# Patient Record
Sex: FEMALE | Race: WHITE | NOT HISPANIC OR LATINO | Employment: FULL TIME
[De-identification: names, ages, dates, MRNs, and addresses within clinical notes are randomized per-mention and may not be internally consistent; named-entity substitution may affect disease eponyms.]

---

## 2018-03-03 ENCOUNTER — HEALTH MAINTENANCE LETTER (OUTPATIENT)
Age: 43
End: 2018-03-03

## 2019-01-15 ENCOUNTER — TRANSFERRED RECORDS (OUTPATIENT)
Dept: HEALTH INFORMATION MANAGEMENT | Facility: CLINIC | Age: 44
End: 2019-01-15

## 2019-01-15 LAB
C TRACH DNA SPEC QL PROBE+SIG AMP: NEGATIVE
HPV ABSTRACT: NORMAL
N GONORRHOEA DNA SPEC QL PROBE+SIG AMP: NEGATIVE
PAP-ABSTRACT: NORMAL
SPECIMEN DESCRIP: NORMAL
SPECIMEN DESCRIPTION: NORMAL

## 2019-03-18 ENCOUNTER — OFFICE VISIT (OUTPATIENT)
Dept: FAMILY MEDICINE | Facility: CLINIC | Age: 44
End: 2019-03-18
Payer: COMMERCIAL

## 2019-03-18 VITALS
BODY MASS INDEX: 32.77 KG/M2 | OXYGEN SATURATION: 100 % | HEIGHT: 66 IN | SYSTOLIC BLOOD PRESSURE: 123 MMHG | HEART RATE: 99 BPM | RESPIRATION RATE: 18 BRPM | DIASTOLIC BLOOD PRESSURE: 77 MMHG | TEMPERATURE: 99.1 F

## 2019-03-18 DIAGNOSIS — Z11.4 SCREENING FOR HIV (HUMAN IMMUNODEFICIENCY VIRUS): ICD-10-CM

## 2019-03-18 DIAGNOSIS — J20.9 ACUTE BRONCHITIS WITH SYMPTOMS > 10 DAYS: Primary | ICD-10-CM

## 2019-03-18 PROCEDURE — 99214 OFFICE O/P EST MOD 30 MIN: CPT | Performed by: PHYSICIAN ASSISTANT

## 2019-03-18 RX ORDER — AZITHROMYCIN 250 MG/1
TABLET, FILM COATED ORAL
Qty: 6 TABLET | Refills: 0 | Status: SHIPPED | OUTPATIENT
Start: 2019-03-18 | End: 2019-03-23

## 2019-03-18 RX ORDER — ALBUTEROL SULFATE 90 UG/1
2 AEROSOL, METERED RESPIRATORY (INHALATION) EVERY 6 HOURS
Qty: 8.5 G | Refills: 0 | Status: SHIPPED | OUTPATIENT
Start: 2019-03-18 | End: 2022-11-22

## 2019-03-18 ASSESSMENT — PAIN SCALES - GENERAL: PAINLEVEL: NO PAIN (0)

## 2019-03-18 NOTE — PROGRESS NOTES
SUBJECTIVE:   Radha Alvarez is a 43 year old female who presents to clinic today for the following health issues:    Acute Illness   Acute illness concerns: cough  Onset: february 14th 2019    Fever: no    Chills/Sweats: no    Headache (location?): no    Sinus Pressure:No- it started off with it and it been gone for 2 weeks now     Conjunctivitis:  no    Ear Pain: no    Rhinorrhea: YES- a little    Congestion: YES    Sore Throat: no- started off with it as well and has been gone for 2 weeks as well     Cough: YES-non-productive but when coughing really hard it becomes productive (4 times)     Wheeze: YES    Shortness of breath: YES     Decreased Appetite: no    Nausea: no    Vomiting: no    Diarrhea:  no    Dysuria/Freq.: no    Fatigue/Achiness: YES- Fatigue    Sick/Strep Exposure: YES- Possibly at a meeting in [place]      Therapies Tried and outcome: DM, sudafed, Theraflu, airborne, afrin, and cough drops            Problem list and histories reviewed & adjusted, as indicated.  Additional history: as documented    Patient Active Problem List   Diagnosis     MS (multiple sclerosis) (H)     Obesity, Class I, BMI 30-34.9     Past Surgical History:   Procedure Laterality Date     TUBAL LIGATION  4/2006       Social History     Tobacco Use     Smoking status: Never Smoker     Smokeless tobacco: Never Used   Substance Use Topics     Alcohol use: Yes     Family History   Family history unknown: Yes         Current Outpatient Medications   Medication Sig Dispense Refill     albuterol (PROAIR HFA/PROVENTIL HFA/VENTOLIN HFA) 108 (90 Base) MCG/ACT inhaler Inhale 2 puffs into the lungs every 6 hours 8.5 g 0     levonorgestrel (MIRENA, 52 MG,) 20 MCG/24HR IUD 1 Device by Intrauterine route       oxymetazoline (AFRIN NASAL SPRAY) 0.05 % nasal spray Spray 2-3 sprays into both nostrils 2 times daily as needed for congestion 1 Bottle 0     fluticasone (FLONASE) 50 MCG/ACT nasal spray Spray 1-2 sprays into both nostrils daily  "(Patient not taking: Reported on 3/18/2019) 1 Bottle 3     temazepam (RESTORIL) 15 MG capsule Take by mouth nightly as needed       No Known Allergies    Reviewed and updated as needed this visit by clinical staff       Reviewed and updated as needed this visit by Provider         ROS:  Constitutional, HEENT, cardiovascular, pulmonary, gi and gu systems are negative, except as otherwise noted.    OBJECTIVE:     /77   Pulse 99   Temp 99.1  F (37.3  C) (Oral)   Resp 18   Ht 1.676 m (5' 6\")   LMP  (LMP Unknown)   SpO2 100%   Breastfeeding? No   BMI 32.77 kg/m    Body mass index is 32.77 kg/m .  GENERAL: healthy, alert and no distress  EYES: Eyes grossly normal to inspection, PERRL and conjunctivae and sclerae normal  HENT: normal cephalic/atraumatic, ear canals and TM's normal, nose and mouth without ulcers or lesions, rhinorrhea yellow, oropharynx clear and oral mucous membranes moist  NECK: no adenopathy, no asymmetry, masses, or scars and thyroid normal to palpation  RESP: no rales , no rhonchi and inspiratory wheezes bilateral  CV: regular rate and rhythm, normal S1 S2, no S3 or S4, no murmur, click or rub, no peripheral edema and peripheral pulses strong  ABDOMEN: soft, nontender, no hepatosplenomegaly, no masses and bowel sounds normal  MS: no gross musculoskeletal defects noted, no edema    Diagnostic Test Results:  none     ASSESSMENT/PLAN:       ICD-10-CM    1. Acute bronchitis with symptoms > 10 days J20.9 azithromycin (ZITHROMAX) 250 MG tablet     albuterol (PROAIR HFA/PROVENTIL HFA/VENTOLIN HFA) 108 (90 Base) MCG/ACT inhaler   2. Screening for HIV (human immunodeficiency virus) Z11.4      1.Azithromycin 2 tablet today then 1 tablet daily for 4 more days  Albuterol 2 puffs every 4-6 hours as needed  Mucinex DM 1 tablet twice a day for 10 days   Increase water   Follow up in 5 days or as needed   2. Declined       Marcella Hernandez PA-C  [facility]  "

## 2019-03-18 NOTE — PATIENT INSTRUCTIONS
Azithromycin 2 tablet today then 1 tablet daily for 4 more days  Albuterol 2 puffs every 4-6 hours as needed  Mucinex DM 1 tablet twice a day for 10 days   Increase water

## 2019-03-18 NOTE — Clinical Note
Please abstract the following data from this visit with this patient into the appropriate field in Epic:Pap smear done on this date: 2/2019 (approximately), by this group: [facility], results were NIL, negative HPV.

## 2019-04-09 DIAGNOSIS — J20.9 ACUTE BRONCHITIS WITH SYMPTOMS > 10 DAYS: ICD-10-CM

## 2019-04-09 NOTE — TELEPHONE ENCOUNTER
"Requested Prescriptions   Pending Prescriptions Disp Refills     VENTOLIN  (90 Base) MCG/ACT inhaler [Pharmacy Med Name: VENTOLIN HFA INH W/DOS CTR [id number]]  Last Written Prescription Date:  03/18/19  Last Fill Quantity: 8.5g,  # refills: 0   Last Office Visit with [facility] or [facility] prescribing provider:  03/18/19-Mary   Future Office Visit:    18 g 0     Sig: INHALE 2 PUFFS INTO THE LUNGS EVERY 6 HOURS       Asthma Maintenance Inhalers - Anticholinergics Passed - 4/9/2019  4:03 AM        Passed - Patient is age 12 years or older        Passed - Recent (12 mo) or future (30 days) visit within the authorizing provider's specialty     Patient had office visit in the last 12 months or has a visit in the next 30 days with authorizing provider or within the authorizing provider's specialty.  See \"Patient Info\" tab in inbasket, or \"Choose Columns\" in Meds & Orders section of the refill encounter.              Passed - Medication is active on med list          "

## 2019-04-10 RX ORDER — ALBUTEROL SULFATE 90 UG/1
AEROSOL, METERED RESPIRATORY (INHALATION)
Qty: 18 G | Refills: 0 | OUTPATIENT
Start: 2019-04-10

## 2019-04-10 NOTE — TELEPHONE ENCOUNTER
Patient needs apt or additional assesment if needs further refills. Inhaler was given for acute bronchitis. No history of asthma or COPD noted in problem list.  Per provider documentation from visit on 3/18/19, patient to RTC if continued symptoms.  Request denied to pharmacy for this reason.    Luli Longoria RN

## 2019-10-03 ENCOUNTER — HEALTH MAINTENANCE LETTER (OUTPATIENT)
Age: 44
End: 2019-10-03

## 2019-12-17 ENCOUNTER — OFFICE VISIT (OUTPATIENT)
Dept: FAMILY MEDICINE | Facility: CLINIC | Age: 44
End: 2019-12-17
Payer: COMMERCIAL

## 2019-12-17 VITALS
TEMPERATURE: 98.6 F | WEIGHT: 217 LBS | RESPIRATION RATE: 16 BRPM | SYSTOLIC BLOOD PRESSURE: 142 MMHG | OXYGEN SATURATION: 98 % | HEART RATE: 92 BPM | DIASTOLIC BLOOD PRESSURE: 84 MMHG | BODY MASS INDEX: 34.87 KG/M2 | HEIGHT: 66 IN

## 2019-12-17 DIAGNOSIS — F41.9 ANXIETY: Primary | ICD-10-CM

## 2019-12-17 DIAGNOSIS — R73.09 ELEVATED GLUCOSE: ICD-10-CM

## 2019-12-17 DIAGNOSIS — R00.2 PALPITATIONS: ICD-10-CM

## 2019-12-17 LAB
ANION GAP SERPL CALCULATED.3IONS-SCNC: 9 MMOL/L (ref 3–14)
BASOPHILS # BLD AUTO: 0 10E9/L (ref 0–0.2)
BASOPHILS NFR BLD AUTO: 0.3 %
BUN SERPL-MCNC: 10 MG/DL (ref 7–30)
CALCIUM SERPL-MCNC: 9.2 MG/DL (ref 8.5–10.1)
CHLORIDE SERPL-SCNC: 106 MMOL/L (ref 94–109)
CO2 SERPL-SCNC: 24 MMOL/L (ref 20–32)
CREAT SERPL-MCNC: 0.71 MG/DL (ref 0.52–1.04)
DIFFERENTIAL METHOD BLD: NORMAL
EOSINOPHIL # BLD AUTO: 0.1 10E9/L (ref 0–0.7)
EOSINOPHIL NFR BLD AUTO: 1 %
ERYTHROCYTE [DISTWIDTH] IN BLOOD BY AUTOMATED COUNT: 12.3 % (ref 10–15)
GFR SERPL CREATININE-BSD FRML MDRD: >90 ML/MIN/{1.73_M2}
GLUCOSE SERPL-MCNC: 135 MG/DL (ref 70–99)
HBA1C MFR BLD: 7.4 % (ref 0–5.6)
HCT VFR BLD AUTO: 42.7 % (ref 35–47)
HGB BLD-MCNC: 15.1 G/DL (ref 11.7–15.7)
LYMPHOCYTES # BLD AUTO: 2.3 10E9/L (ref 0.8–5.3)
LYMPHOCYTES NFR BLD AUTO: 34.8 %
MCH RBC QN AUTO: 30.2 PG (ref 26.5–33)
MCHC RBC AUTO-ENTMCNC: 35.4 G/DL (ref 31.5–36.5)
MCV RBC AUTO: 85 FL (ref 78–100)
MONOCYTES # BLD AUTO: 0.5 10E9/L (ref 0–1.3)
MONOCYTES NFR BLD AUTO: 7.9 %
NEUTROPHILS # BLD AUTO: 3.8 10E9/L (ref 1.6–8.3)
NEUTROPHILS NFR BLD AUTO: 56 %
PLATELET # BLD AUTO: 327 10E9/L (ref 150–450)
POTASSIUM SERPL-SCNC: 3.6 MMOL/L (ref 3.4–5.3)
RBC # BLD AUTO: 5 10E12/L (ref 3.8–5.2)
SODIUM SERPL-SCNC: 139 MMOL/L (ref 133–144)
TSH SERPL DL<=0.005 MIU/L-ACNC: 0.57 MU/L (ref 0.4–4)
WBC # BLD AUTO: 6.7 10E9/L (ref 4–11)

## 2019-12-17 PROCEDURE — 36415 COLL VENOUS BLD VENIPUNCTURE: CPT | Performed by: NURSE PRACTITIONER

## 2019-12-17 PROCEDURE — 83036 HEMOGLOBIN GLYCOSYLATED A1C: CPT | Performed by: NURSE PRACTITIONER

## 2019-12-17 PROCEDURE — 93000 ELECTROCARDIOGRAM COMPLETE: CPT | Performed by: NURSE PRACTITIONER

## 2019-12-17 PROCEDURE — 80048 BASIC METABOLIC PNL TOTAL CA: CPT | Performed by: NURSE PRACTITIONER

## 2019-12-17 PROCEDURE — 85025 COMPLETE CBC W/AUTO DIFF WBC: CPT | Performed by: NURSE PRACTITIONER

## 2019-12-17 PROCEDURE — 84443 ASSAY THYROID STIM HORMONE: CPT | Performed by: NURSE PRACTITIONER

## 2019-12-17 PROCEDURE — 99214 OFFICE O/P EST MOD 30 MIN: CPT | Performed by: NURSE PRACTITIONER

## 2019-12-17 RX ORDER — HYDROXYZINE HYDROCHLORIDE 10 MG/1
10-50 TABLET, FILM COATED ORAL EVERY 6 HOURS PRN
Qty: 60 TABLET | Refills: 2 | Status: SHIPPED | OUTPATIENT
Start: 2019-12-17 | End: 2022-10-26

## 2019-12-17 ASSESSMENT — ANXIETY QUESTIONNAIRES
5. BEING SO RESTLESS THAT IT IS HARD TO SIT STILL: NEARLY EVERY DAY
6. BECOMING EASILY ANNOYED OR IRRITABLE: MORE THAN HALF THE DAYS
GAD7 TOTAL SCORE: 19
IF YOU CHECKED OFF ANY PROBLEMS ON THIS QUESTIONNAIRE, HOW DIFFICULT HAVE THESE PROBLEMS MADE IT FOR YOU TO DO YOUR WORK, TAKE CARE OF THINGS AT HOME, OR GET ALONG WITH OTHER PEOPLE: VERY DIFFICULT
1. FEELING NERVOUS, ANXIOUS, OR ON EDGE: NEARLY EVERY DAY
7. FEELING AFRAID AS IF SOMETHING AWFUL MIGHT HAPPEN: MORE THAN HALF THE DAYS
2. NOT BEING ABLE TO STOP OR CONTROL WORRYING: NEARLY EVERY DAY
3. WORRYING TOO MUCH ABOUT DIFFERENT THINGS: NEARLY EVERY DAY

## 2019-12-17 ASSESSMENT — MIFFLIN-ST. JEOR: SCORE: 1656.06

## 2019-12-17 ASSESSMENT — PATIENT HEALTH QUESTIONNAIRE - PHQ9
5. POOR APPETITE OR OVEREATING: NEARLY EVERY DAY
SUM OF ALL RESPONSES TO PHQ QUESTIONS 1-9: 0

## 2019-12-17 ASSESSMENT — PAIN SCALES - GENERAL: PAINLEVEL: NO PAIN (0)

## 2019-12-17 NOTE — PROGRESS NOTES
Ariadne Alvarez is a 44 year old female who presents to clinic today for the following health issues:    HPI   Anxiety     How are you doing with your anxiety since your last visit? Worsened     Are you having other symptoms that might be associated with anxiety? Yes:  chest tightness, rapid heart rate, shakiness     Have you had a significant life event? Relationship Concerns, Job Concerns and OTHER: holiday      Are you feeling depressed? No    Do you have any concerns with your use of alcohol or other drugs? No  Patient has been under a lot of stress recently with work (she is a  in healthcare and preparing for the holidays).  She also states a 10 year relationship just ended on 12/8.  Since then she has noted panic attacks as described above.  Feels like her normal coping skills are not working to deal with stressors.  Has never been treated for anxiety or depression previously.  She did go through therapy when going through a divorce 10 years ago but has not done therapy since then.      Social History     Tobacco Use     Smoking status: Never Smoker     Smokeless tobacco: Never Used   Substance Use Topics     Alcohol use: Yes     Drug use: No     RANJANA-7 SCORE 12/17/2019   Total Score 19     PHQ 12/17/2019   PHQ-9 Total Score 0   Q9: Thoughts of better off dead/self-harm past 2 weeks Not at all     Last PHQ-9 12/17/2019   1.  Little interest or pleasure in doing things 0   2.  Feeling down, depressed, or hopeless 0   3.  Trouble falling or staying asleep, or sleeping too much 0   4.  Feeling tired or having little energy 0   5.  Poor appetite or overeating 0   6.  Feeling bad about yourself 0   7.  Trouble concentrating 0   8.  Moving slowly or restless 0   Q9: Thoughts of better off dead/self-harm past 2 weeks 0   PHQ-9 Total Score 0   Difficulty at work, home, or with people Not difficult at all     RANJANA-7  12/17/2019   1. Feeling nervous, anxious, or on edge 3   2. Not being able to stop or  control worrying 3   3. Worrying too much about different things 3   4. Trouble relaxing 3   5. Being so restless that it is hard to sit still 3   6. Becoming easily annoyed or irritable 2   7. Feeling afraid, as if something awful might happen 2   RANJANA-7 Total Score 19   If you checked any problems, how difficult have they made it for you to do your work, take care of things at home, or get along with other people? Very difficult         How many days per week do you miss taking your medication? 0      Patient Active Problem List   Diagnosis     MS (multiple sclerosis) (H)     Obesity, Class I, BMI 30-34.9     Past Surgical History:   Procedure Laterality Date     TUBAL LIGATION  4/2006       Social History     Tobacco Use     Smoking status: Never Smoker     Smokeless tobacco: Never Used   Substance Use Topics     Alcohol use: Yes     Family History   Family history unknown: Yes         Current Outpatient Medications   Medication Sig Dispense Refill     hydrOXYzine (ATARAX) 10 MG tablet Take 1-5 tablets (10-50 mg) by mouth every 6 hours as needed for anxiety 60 tablet 2     levonorgestrel (MIRENA, 52 MG,) 20 MCG/24HR IUD 1 Device by Intrauterine route       albuterol (PROAIR HFA/PROVENTIL HFA/VENTOLIN HFA) 108 (90 Base) MCG/ACT inhaler Inhale 2 puffs into the lungs every 6 hours (Patient not taking: Reported on 12/17/2019) 8.5 g 0     fluticasone (FLONASE) 50 MCG/ACT nasal spray Spray 1-2 sprays into both nostrils daily (Patient not taking: Reported on 3/18/2019) 1 Bottle 3     oxymetazoline (AFRIN NASAL SPRAY) 0.05 % nasal spray Spray 2-3 sprays into both nostrils 2 times daily as needed for congestion (Patient not taking: Reported on 12/17/2019) 1 Bottle 0     temazepam (RESTORIL) 15 MG capsule Take by mouth nightly as needed       Allergies   Allergen Reactions     Adhesive Tape      Shellfish Allergy Hives and Other (See Comments)     Body swelled      BP Readings from Last 3 Encounters:   12/17/19 (!) 142/84  "  03/18/19 123/77   11/22/16 112/81    Wt Readings from Last 3 Encounters:   12/17/19 98.4 kg (217 lb)   11/22/16 92.1 kg (203 lb)   06/08/16 91.2 kg (201 lb)                      Reviewed and updated as needed this visit by Provider         Review of Systems   ROS COMP: Constitutional, HEENT, cardiovascular, pulmonary, GI, , musculoskeletal, neuro, skin, endocrine and psych systems are negative, except as otherwise noted.      Objective    BP (!) 142/84 (BP Location: Right arm, Patient Position: Chair, Cuff Size: Adult Large)   Pulse 92   Temp 98.6  F (37  C) (Oral)   Resp 16   Ht 1.676 m (5' 6\")   Wt 98.4 kg (217 lb)   SpO2 98%   BMI 35.02 kg/m    Body mass index is 35.02 kg/m .  Physical Exam   GENERAL: healthy, alert and no distress  NECK: no adenopathy, no asymmetry, masses, or scars and thyroid normal to palpation  RESP: lungs clear to auscultation - no rales, rhonchi or wheezes  CV: regular rate and rhythm, normal S1 S2, no S3 or S4, no murmur, click or rub, no peripheral edema and peripheral pulses strong  ABDOMEN: soft, nontender, no hepatosplenomegaly, no masses and bowel sounds normal  MS: no gross musculoskeletal defects noted, no edema  PSYCH: mentation appears normal, tearful and anxious    Diagnostic Test Results:  No results found for this or any previous visit (from the past 24 hour(s)).  EKG - appears normal, NSR, normal axis, normal intervals, no acute ST/T changes c/w ischemia, no LVH by voltage criteria, there are no prior tracings available        Assessment & Plan     1. Anxiety  Patient does not want long term medication at this time though we discussed serotonin specific reuptake inhibitors as an option.  Will start with hydroxyzine.  Can call in 1-2 days if not working.  Then would try Buspar.  Discussed mindfulness strategies, exercise, other coping mechanisms.  She will schedule with her previous therapist.  If unable, referral entered for our clinic.   - hydrOXYzine (ATARAX) 10 " "MG tablet; Take 1-5 tablets (10-50 mg) by mouth every 6 hours as needed for anxiety  Dispense: 60 tablet; Refill: 2  - MENTAL HEALTH REFERRAL  - Adult; Outpatient Treatment; Individual/Couples/Family/Group Therapy/Health Psychology; G: [facility] [phone]; We will contact you to schedule the appointment or please call with any questions    2. Palpitations  Normal EKG.  - EKG 12-lead complete w/read - Clinics  - Basic metabolic panel  (Ca, Cl, CO2, Creat, Gluc, K, Na, BUN)  - CBC with platelets and differential  - TSH with free T4 reflex    3. Elevated glucose  As seen on previous labs.    - Hemoglobin A1c     BMI:   Estimated body mass index is 35.02 kg/m  as calculated from the following:    Height as of this encounter: 1.676 m (5' 6\").    Weight as of this encounter: 98.4 kg (217 lb).   Weight management plan: Discussed healthy diet and exercise guidelines        Patient Instructions   EKG is normal.      Take hydroxyzine every 6 hours 1-5 tablets as needed for anxiety.    Start therapy.  Follow up in 3 weeks or sooner if needed.          Return in about 3 weeks (around 1/7/2020) for Anxiety Follow up.    [facility]        "

## 2019-12-17 NOTE — LETTER
My Depression Action Plan  Name: Radha Alvarez   Date of Birth 1975  Date: 12/17/2019    My doctor: Katerin Jiang   My clinic: [address] [id number] [phone]          GREEN    ZONE   Good Control    What it looks like:     Things are going generally well. You have normal up s and down s. You may even feel depressed from time to time, but bad moods usually last less than a day.   What you need to do:  1. Continue to care for yourself (see self care plan)  2. Check your depression survival kit and update it as needed  3. Follow your physician s recommendations including any medication.  4. Do not stop taking medication unless you consult with your physician first.           YELLOW         ZONE Getting Worse    What it looks like:     Depression is starting to interfere with your life.     It may be hard to get out of bed; you may be starting to isolate yourself from others.    Symptoms of depression are starting to last most all day and this has happened for several days.     You may have suicidal thoughts but they are not constant.   What you need to do:     1. Call your care team, your response to treatment will improve if you keep your care team informed of your progress. Yellow periods are signs an adjustment may need to be made.     2. Continue your self-care, even if you have to fake it!    3. Talk to someone in your support network    4. Open up your depression survival kit           RED    ZONE Medical Alert - Get Help    What it looks like:     Depression is seriously interfering with your life.     You may experience these or other symptoms: You can t get out of bed most days, can t work or engage in other necessary activities, you have trouble taking care of basic hygiene, or basic responsibilities, thoughts of suicide or death that will not go away, self-injurious behavior.     What you need to do:  1. Call your care  team and request a same-day appointment. If they are not available (weekends or after hours) call your local crisis line, emergency room or 911.            Depression Self Care Plan / Survival Kit    Self-Care for Depression  Here s the deal. Your body and mind are really not as separate as most people think.  What you do and think affects how you feel and how you feel influences what you do and think. This means if you do things that people who feel good do, it will help you feel better.  Sometimes this is all it takes.  There is also a place for medication and therapy depending on how severe your depression is, so be sure to consult with your medical provider and/ or Behavioral Health Consultant if your symptoms are worsening or not improving.     In order to better manage my stress, I will:    Exercise  Get some form of exercise, every day. This will help reduce pain and release endorphins, the  feel good  chemicals in your brain. This is almost as good as taking antidepressants!  This is not the same as joining a gym and then never going! (they count on that by the way ) It can be as simple as just going for a walk or doing some gardening, anything that will get you moving.      Hygiene   Maintain good hygiene (Get out of bed in the morning, Make your bed, Brush your teeth, Take a shower, and Get dressed like you were going to work, even if you are unemployed).  If your clothes don't fit try to get ones that do.    Diet  I will strive to eat foods that are good for me, drink plenty of water, and avoid excessive sugar, caffeine, alcohol, and other mood-altering substances.  Some foods that are helpful in depression are: complex carbohydrates, B vitamins, flaxseed, fish or fish oil, fresh fruits and vegetables.    Psychotherapy  I agree to participate in Individual Therapy (if recommended).    Medication  If prescribed medications, I agree to take them.  Missing doses can result in serious side effects.  I  understand that drinking alcohol, or other illicit drug use, may cause potential side effects.  I will not stop my medication abruptly without first discussing it with my provider.    Staying Connected With Others  I will stay in touch with my friends, family members, and my primary care provider/team.    Use your imagination  Be creative.  We all have a creative side; it doesn t matter if it s oil painting, sand castles, or mud pies! This will also kick up the endorphins.    Witness Beauty  (AKA stop and smell the roses) Take a look outside, even in mid-winter. Notice colors, textures. Watch the squirrels and birds.     Service to others  Be of service to others.  There is always someone else in need.  By helping others we can  get out of ourselves  and remember the really important things.  This also provides opportunities for practicing all the other parts of the program.    Humor  Laugh and be silly!  Adjust your TV habits for less news and crime-drama and more comedy.    Control your stress  Try breathing deep, massage therapy, biofeedback, and meditation. Find time to relax each day.     My support system    Clinic Contact:  Phone number:    Contact 1:  Phone number:    Contact 2:  Phone number:    Oriental orthodox/:  Phone number:    Therapist:  Phone number:    Local crisis center:    Phone number:    Other community support:  Phone number:

## 2019-12-17 NOTE — PATIENT INSTRUCTIONS
EKG is normal.      Take hydroxyzine every 6 hours 1-5 tablets as needed for anxiety.    Start therapy.  Follow up in 3 weeks or sooner if needed.

## 2019-12-18 ASSESSMENT — ANXIETY QUESTIONNAIRES: GAD7 TOTAL SCORE: 19

## 2019-12-19 ENCOUNTER — TELEPHONE (OUTPATIENT)
Dept: FAMILY MEDICINE | Facility: CLINIC | Age: 44
End: 2019-12-19

## 2019-12-19 DIAGNOSIS — E11.9 TYPE 2 DIABETES MELLITUS WITHOUT COMPLICATION, WITHOUT LONG-TERM CURRENT USE OF INSULIN (H): Primary | ICD-10-CM

## 2019-12-19 NOTE — TELEPHONE ENCOUNTER
We did not reach Radha Alvarez, we left a message with our contact number [phone].  If we do not hear from them within the next 3 business days we will mail a letter offering our scheduling services.    If they do call to schedule, in the future, we will inform you of the outcome.    Thank you for your referral,  [facility]

## 2019-12-19 NOTE — TELEPHONE ENCOUNTER
Please call patient as she does not check her mychart messages.  Her labs came back positive for diabetes.  Needs to make an appointment to discuss treatment. Thanks!  [place]

## 2019-12-19 NOTE — TELEPHONE ENCOUNTER
Patient contacted and informed of the below per provider documentation. Patient verbalizes understanding. She declined scheduling an appointment and requests to call back to schedule as she is in the middle of finding a PCP.     Janelle Byers RN

## 2020-01-03 ENCOUNTER — MYC MEDICAL ADVICE (OUTPATIENT)
Dept: FAMILY MEDICINE | Facility: CLINIC | Age: 45
End: 2020-01-03

## 2020-11-07 ENCOUNTER — HEALTH MAINTENANCE LETTER (OUTPATIENT)
Age: 45
End: 2020-11-07

## 2021-03-21 ENCOUNTER — HEALTH MAINTENANCE LETTER (OUTPATIENT)
Age: 46
End: 2021-03-21

## 2021-07-11 ENCOUNTER — HEALTH MAINTENANCE LETTER (OUTPATIENT)
Age: 46
End: 2021-07-11

## 2021-09-05 ENCOUNTER — HEALTH MAINTENANCE LETTER (OUTPATIENT)
Age: 46
End: 2021-09-05

## 2021-10-07 ENCOUNTER — IMMUNIZATION (OUTPATIENT)
Dept: NURSING | Facility: CLINIC | Age: 46
End: 2021-10-07
Payer: COMMERCIAL

## 2021-10-07 PROCEDURE — 91301 PR COVID VAC MODERNA 100 MCG/0.5 ML IM: CPT

## 2021-10-07 PROCEDURE — 0011A PR COVID VAC MODERNA 100 MCG/0.5 ML IM: CPT

## 2021-10-31 ENCOUNTER — HEALTH MAINTENANCE LETTER (OUTPATIENT)
Age: 46
End: 2021-10-31

## 2021-11-04 ENCOUNTER — IMMUNIZATION (OUTPATIENT)
Dept: NURSING | Facility: CLINIC | Age: 46
End: 2021-11-04
Attending: FAMILY MEDICINE
Payer: COMMERCIAL

## 2021-11-04 PROCEDURE — 0012A PR COVID VAC MODERNA 100 MCG/0.5 ML IM: CPT

## 2021-11-04 PROCEDURE — 91301 PR COVID VAC MODERNA 100 MCG/0.5 ML IM: CPT

## 2021-12-26 ENCOUNTER — HEALTH MAINTENANCE LETTER (OUTPATIENT)
Age: 46
End: 2021-12-26

## 2022-02-04 ENCOUNTER — OFFICE VISIT (OUTPATIENT)
Dept: URGENT CARE | Facility: URGENT CARE | Age: 47
End: 2022-02-04
Payer: COMMERCIAL

## 2022-02-04 VITALS
SYSTOLIC BLOOD PRESSURE: 138 MMHG | TEMPERATURE: 98.1 F | BODY MASS INDEX: 34.81 KG/M2 | HEART RATE: 89 BPM | WEIGHT: 215.7 LBS | DIASTOLIC BLOOD PRESSURE: 91 MMHG | OXYGEN SATURATION: 99 %

## 2022-02-04 DIAGNOSIS — R05.9 COUGH: Primary | ICD-10-CM

## 2022-02-04 DIAGNOSIS — G35 MS (MULTIPLE SCLEROSIS) (H): ICD-10-CM

## 2022-02-04 DIAGNOSIS — R07.0 THROAT PAIN: ICD-10-CM

## 2022-02-04 LAB
DEPRECATED S PYO AG THROAT QL EIA: NEGATIVE
GROUP A STREP BY PCR: NOT DETECTED

## 2022-02-04 PROCEDURE — 99213 OFFICE O/P EST LOW 20 MIN: CPT | Performed by: PHYSICIAN ASSISTANT

## 2022-02-04 PROCEDURE — 87651 STREP A DNA AMP PROBE: CPT | Performed by: PHYSICIAN ASSISTANT

## 2022-02-04 RX ORDER — BENZONATATE 100 MG/1
100 CAPSULE ORAL 3 TIMES DAILY PRN
Qty: 30 CAPSULE | Refills: 0 | Status: SHIPPED | OUTPATIENT
Start: 2022-02-04 | End: 2022-02-14

## 2022-02-04 RX ORDER — DEXTROAMPHETAMINE/AMPHETAMINE 15 MG
CAPSULE, EXT RELEASE 24 HR ORAL
COMMUNITY
Start: 2022-01-28 | End: 2022-12-26

## 2022-02-04 NOTE — PROGRESS NOTES
Chief Complaint   Patient presents with     Cough     Nose Problem     Throat Pain                 ASSESSMENT:     ICD-10-CM    1. Cough  R05.9 benzonatate (TESSALON) 100 MG capsule   2. Throat pain  R07.0 Streptococcus A Rapid Screen w/Reflex to PCR - Clinic Collect     Group A Streptococcus PCR Throat Swab     benzonatate (TESSALON) 100 MG capsule   3. MS (multiple sclerosis) (H)  G35            PLAN: Further strep test pending. Likely viral respiratory illness. Yasmany Long.  I have discussed clinical findings with patient.  Side effects of medications discussed.  Symptomatic care is discussed.  I have discussed the possibility of  worsening symptoms and indication to RTC or go to the ER if they occur.  All questions are answered, patient indicates understanding of these issues and is in agreement with plan.   Patient care instructions are discussed/given at the end of visit.   Lots of rest and fluids.      Usha Jose PA-C      SUBJECTIVE:    46-year-old female presents for congestion and dry cough for 3-1/2 days. Has had some coughing fits. Both ears are itching and painful. Also with sore throat. Does have seasonal allergies. Did a home Covid test this morning which was negative. History of MS and prediabetes per patient.    Allergies   Allergen Reactions     Adhesive Tape      Shellfish Allergy Hives and Other (See Comments)     Body swelled        Past Medical History:   Diagnosis Date     Incompetences, cervix 2004, 2006    cerclage 2004     MS (multiple sclerosis) (H) 7/2008       hydrOXYzine (ATARAX) 10 MG tablet, Take 1-5 tablets (10-50 mg) by mouth every 6 hours as needed for anxiety  levonorgestrel (MIRENA, 52 MG,) 20 MCG/24HR IUD, 1 Device by Intrauterine route  ADDERALL XR 15 MG 24 hr capsule,   albuterol (PROAIR HFA/PROVENTIL HFA/VENTOLIN HFA) 108 (90 Base) MCG/ACT inhaler, Inhale 2 puffs into the lungs every 6 hours (Patient not taking: Reported on 12/17/2019)  fluticasone (FLONASE) 50  MCG/ACT nasal spray, Spray 1-2 sprays into both nostrils daily (Patient not taking: Reported on 3/18/2019)  oxymetazoline (AFRIN NASAL SPRAY) 0.05 % nasal spray, Spray 2-3 sprays into both nostrils 2 times daily as needed for congestion (Patient not taking: Reported on 12/17/2019)  temazepam (RESTORIL) 15 MG capsule, Take by mouth nightly as needed (Patient not taking: Reported on 2/4/2022)    No current facility-administered medications on file prior to visit.      Social History     Tobacco Use     Smoking status: Never Smoker     Smokeless tobacco: Never Used   Substance Use Topics     Alcohol use: Yes       ROS:  CONSTITUTIONAL: Negative for fatigue or fever.  EYES: Negative for eye problems.  ENT: As above.  RESP: As above.  CV: Negative for chest pains.  GI: Negative for vomiting.  MUSCULOSKELETAL:  Negative for significant muscle or joint pains.  NEUROLOGIC: Negative for headaches.  SKIN: Negative for rash.  PSYCH: Normal mentation for age.    OBJECTIVE:  BP (!) 138/91   Pulse 89   Temp 98.1  F (36.7  C) (Tympanic)   Wt 97.8 kg (215 lb 11.2 oz)   SpO2 99%   BMI 34.81 kg/m    GENERAL APPEARANCE: Healthy, alert and no distress.  EYES:Conjunctiva/sclera clear.  EARS: No cerumen.   Ear canals w/o erythema.  TM's intact w/o erythema.    THROAT: Mild to moderate erythema w/o tonsillar enlargement . No exudates.  NECK: Supple, nontender, no lymphadenopathy.  RESP: Lungs clear to auscultation - no rales, rhonchi or wheezes  CV: Regular rate and rhythm, normal S1 S2, no murmur noted.  NEURO: Awake, alert    SKIN: No rashes    Results for orders placed or performed in visit on 02/04/22   Streptococcus A Rapid Screen w/Reflex to PCR - Clinic Collect     Status: Normal    Specimen: Throat; Swab   Result Value Ref Range    Group A Strep antigen Negative Negative       Usha Jose PA-C

## 2022-02-20 ENCOUNTER — HEALTH MAINTENANCE LETTER (OUTPATIENT)
Age: 47
End: 2022-02-20

## 2022-05-18 ENCOUNTER — ANCILLARY PROCEDURE (OUTPATIENT)
Dept: MAMMOGRAPHY | Facility: CLINIC | Age: 47
End: 2022-05-18
Payer: COMMERCIAL

## 2022-05-18 DIAGNOSIS — Z12.31 VISIT FOR SCREENING MAMMOGRAM: ICD-10-CM

## 2022-05-18 PROCEDURE — 77063 BREAST TOMOSYNTHESIS BI: CPT | Mod: GC | Performed by: STUDENT IN AN ORGANIZED HEALTH CARE EDUCATION/TRAINING PROGRAM

## 2022-05-18 PROCEDURE — 77067 SCR MAMMO BI INCL CAD: CPT | Mod: GC | Performed by: STUDENT IN AN ORGANIZED HEALTH CARE EDUCATION/TRAINING PROGRAM

## 2022-06-12 ENCOUNTER — HEALTH MAINTENANCE LETTER (OUTPATIENT)
Age: 47
End: 2022-06-12

## 2022-07-26 ENCOUNTER — LAB REQUISITION (OUTPATIENT)
Dept: LAB | Facility: CLINIC | Age: 47
End: 2022-07-26
Payer: COMMERCIAL

## 2022-07-26 DIAGNOSIS — Z11.3 ENCOUNTER FOR SCREENING FOR INFECTIONS WITH A PREDOMINANTLY SEXUAL MODE OF TRANSMISSION: ICD-10-CM

## 2022-07-26 PROCEDURE — 86592 SYPHILIS TEST NON-TREP QUAL: CPT | Mod: ORL | Performed by: PHYSICIAN ASSISTANT

## 2022-07-26 PROCEDURE — 86803 HEPATITIS C AB TEST: CPT | Mod: ORL | Performed by: PHYSICIAN ASSISTANT

## 2022-07-26 PROCEDURE — 87340 HEPATITIS B SURFACE AG IA: CPT | Mod: ORL | Performed by: PHYSICIAN ASSISTANT

## 2022-07-26 PROCEDURE — 87389 HIV-1 AG W/HIV-1&-2 AB AG IA: CPT | Mod: ORL | Performed by: PHYSICIAN ASSISTANT

## 2022-07-26 PROCEDURE — 87491 CHLMYD TRACH DNA AMP PROBE: CPT | Mod: ORL | Performed by: PHYSICIAN ASSISTANT

## 2022-07-27 LAB
C TRACH DNA SPEC QL PROBE+SIG AMP: NEGATIVE
HBV SURFACE AG SERPL QL IA: NONREACTIVE
HCV AB SERPL QL IA: NONREACTIVE
HIV 1+2 AB+HIV1 P24 AG SERPL QL IA: NONREACTIVE
N GONORRHOEA DNA SPEC QL NAA+PROBE: NEGATIVE
RPR SER QL: NONREACTIVE

## 2022-08-24 ENCOUNTER — OFFICE VISIT (OUTPATIENT)
Dept: ENDOCRINOLOGY | Facility: CLINIC | Age: 47
End: 2022-08-24
Payer: COMMERCIAL

## 2022-08-24 ENCOUNTER — TELEPHONE (OUTPATIENT)
Dept: ENDOCRINOLOGY | Facility: CLINIC | Age: 47
End: 2022-08-24

## 2022-08-24 ENCOUNTER — LAB (OUTPATIENT)
Dept: LAB | Facility: CLINIC | Age: 47
End: 2022-08-24

## 2022-08-24 VITALS
SYSTOLIC BLOOD PRESSURE: 121 MMHG | OXYGEN SATURATION: 98 % | HEART RATE: 74 BPM | DIASTOLIC BLOOD PRESSURE: 82 MMHG | BODY MASS INDEX: 35.12 KG/M2 | WEIGHT: 217.6 LBS

## 2022-08-24 DIAGNOSIS — E66.01 MORBID OBESITY (H): ICD-10-CM

## 2022-08-24 DIAGNOSIS — E11.9 TYPE 2 DIABETES MELLITUS WITHOUT COMPLICATION, WITHOUT LONG-TERM CURRENT USE OF INSULIN (H): ICD-10-CM

## 2022-08-24 DIAGNOSIS — E04.9 GOITER: ICD-10-CM

## 2022-08-24 DIAGNOSIS — E11.9 TYPE 2 DIABETES MELLITUS WITHOUT COMPLICATION, WITHOUT LONG-TERM CURRENT USE OF INSULIN (H): Primary | ICD-10-CM

## 2022-08-24 LAB
ANION GAP SERPL CALCULATED.3IONS-SCNC: 8 MMOL/L (ref 3–14)
BUN SERPL-MCNC: 14 MG/DL (ref 7–30)
CALCIUM SERPL-MCNC: 9.2 MG/DL (ref 8.5–10.1)
CHLORIDE BLD-SCNC: 103 MMOL/L (ref 94–109)
CHOLEST SERPL-MCNC: 215 MG/DL
CO2 SERPL-SCNC: 24 MMOL/L (ref 20–32)
CREAT SERPL-MCNC: 0.84 MG/DL (ref 0.52–1.04)
CREAT UR-MCNC: 194 MG/DL
FASTING STATUS PATIENT QL REPORTED: NO
GFR SERPL CREATININE-BSD FRML MDRD: 87 ML/MIN/1.73M2
GLUCOSE BLD-MCNC: 268 MG/DL (ref 70–99)
HDLC SERPL-MCNC: 51 MG/DL
LDLC SERPL CALC-MCNC: 116 MG/DL
MICROALBUMIN UR-MCNC: 17 MG/L
MICROALBUMIN/CREAT UR: 8.76 MG/G CR (ref 0–25)
NONHDLC SERPL-MCNC: 164 MG/DL
POTASSIUM BLD-SCNC: 3.5 MMOL/L (ref 3.4–5.3)
SODIUM SERPL-SCNC: 135 MMOL/L (ref 133–144)
TRIGL SERPL-MCNC: 240 MG/DL
TSH SERPL DL<=0.005 MIU/L-ACNC: 0.99 MU/L (ref 0.4–4)

## 2022-08-24 PROCEDURE — 36415 COLL VENOUS BLD VENIPUNCTURE: CPT

## 2022-08-24 PROCEDURE — 80061 LIPID PANEL: CPT

## 2022-08-24 PROCEDURE — 84403 ASSAY OF TOTAL TESTOSTERONE: CPT

## 2022-08-24 PROCEDURE — 82670 ASSAY OF TOTAL ESTRADIOL: CPT

## 2022-08-24 PROCEDURE — 82043 UR ALBUMIN QUANTITATIVE: CPT

## 2022-08-24 PROCEDURE — 83001 ASSAY OF GONADOTROPIN (FSH): CPT

## 2022-08-24 PROCEDURE — 82306 VITAMIN D 25 HYDROXY: CPT

## 2022-08-24 PROCEDURE — 99205 OFFICE O/P NEW HI 60 MIN: CPT | Performed by: INTERNAL MEDICINE

## 2022-08-24 PROCEDURE — 80048 BASIC METABOLIC PNL TOTAL CA: CPT

## 2022-08-24 PROCEDURE — 83002 ASSAY OF GONADOTROPIN (LH): CPT

## 2022-08-24 PROCEDURE — 82533 TOTAL CORTISOL: CPT

## 2022-08-24 PROCEDURE — 82024 ASSAY OF ACTH: CPT

## 2022-08-24 PROCEDURE — 84270 ASSAY OF SEX HORMONE GLOBUL: CPT

## 2022-08-24 PROCEDURE — 84443 ASSAY THYROID STIM HORMONE: CPT

## 2022-08-24 PROCEDURE — 83498 ASY HYDROXYPROGESTERONE 17-D: CPT

## 2022-08-24 RX ORDER — METFORMIN HCL 500 MG
1000 TABLET, EXTENDED RELEASE 24 HR ORAL 2 TIMES DAILY WITH MEALS
Qty: 360 TABLET | Refills: 3 | Status: SHIPPED | OUTPATIENT
Start: 2022-08-24 | End: 2022-11-22

## 2022-08-24 RX ORDER — PROPRANOLOL HYDROCHLORIDE 20 MG/1
TABLET ORAL
COMMUNITY
Start: 2022-05-09 | End: 2022-10-25

## 2022-08-24 RX ORDER — PROCHLORPERAZINE 25 MG/1
1 SUPPOSITORY RECTAL CONTINUOUS PRN
Qty: 1 EACH | Refills: 3 | Status: SHIPPED | OUTPATIENT
Start: 2022-08-24 | End: 2022-11-22

## 2022-08-24 RX ORDER — DEXAMETHASONE 1 MG
1 TABLET ORAL ONCE
Qty: 1 TABLET | Refills: 0 | Status: SHIPPED | OUTPATIENT
Start: 2022-08-24 | End: 2022-11-22

## 2022-08-24 RX ORDER — PROCHLORPERAZINE 25 MG/1
1 SUPPOSITORY RECTAL CONTINUOUS PRN
Qty: 3 EACH | Refills: 5 | Status: SHIPPED | OUTPATIENT
Start: 2022-08-24 | End: 2022-11-22

## 2022-08-24 NOTE — NURSING NOTE
Chief Complaint   Patient presents with     New Patient     High A1C       Vitals:    08/24/22 0803   BP: 121/82   BP Location: Right arm   Patient Position: Sitting   Cuff Size: Adult Large   Pulse: 74   SpO2: 98%   Weight: 98.7 kg (217 lb 9.6 oz)       Body mass index is 35.12 kg/m .      VADIM Huber

## 2022-08-24 NOTE — TELEPHONE ENCOUNTER
PA Initiation    Medication: Dexcom G6 Sensors & Transmitters - PA Initiated   Insurance Company: [employer] ([facility]) - Phone [phone] Fax [phone]  Pharmacy Filling the Rx: [id number] - [place], [place] - [address] [facility] [address]  Filling Pharmacy Phone:    Filling Pharmacy Fax:    Start Date: 8/24/2022

## 2022-08-24 NOTE — PROGRESS NOTES
Radha Alvarez is a 46 year old yo female here to establish care of Diabetes Mellitus. Also with PMHx of MS (Dx 2009). Never seen endo before in past    Over past year, has felt that overall her health is declining and ready to get a jumpstart.  Feels that she is gaining or at least not able to lose weight, overall feels unwell has worsening fatigue etc.  Hoping this visit can jumpstart control of her diabetes and getting her health back on track      1) Diabetes Mellitus    Diabetes History:  Diagnosis: 2 years ago with pre-diabetes, made lifestyle changes and got into prediabetes range. Noted blurry/double vision and BG was elevated.   Hospitalizations: none  Previous Regimens: Tried dexcom but was allergic to adhesive  Current Regimen: none    BG check- DEXCOM Only wearing last 3 days  Trends-         Complications: none    Last eye exam: not recently  Foot Exam: Completed today, no neuropathy  ACEI/ARB: Not currently indicated but no recent DEE  Statin/ASA: Not currently indicated but no recent lipid panel    2) Fatigue, weight gain  Currently experiecing blurry vision, extreme fatigue, headaches, minimal appetite, trouble focusing/brain fog.  Regular periods prior to mirena but with some breakthrough bleeding (prior to placement), no bleeding now  Notes difficulty losing weight-- eating balanced diet/gym 3x weekly has always been able to lose weight and now feels that is long process to lose 5 lbs  HPA-       - Deficit: occasional lightheadedness/dizziness      - Excess: notes easy bruising, denies poor healing, weight gain/difficulty losing weight  HPT-       - Deficit: denies cold intolerance, constipation, notes fatigue,        - Excess:denies heat intolerance, notes occasional diarrhea/tenesmus, notes hair loss (temples/center), notes all anxiety, palpations, tremor  HPG- experiencing night sweats/feeling hot overnight between 3-5a  - denies polyuria/polydypsia, nocturia  GH- denies change in  hat/ring/shoe size, denies change in energy level  Prolactin- none change in energy level, denies change in menses, denies galactorrhea    Denies vision changes including blurry vision, double vision or change in peripheral vision. Denies headaches.      6/4/2021- [facility] [place][note 3]  A1C 6.1%  TSH 1.28  FT4 1.09  ESR 7  CRP <0.3             BP Readings from Last 3 Encounters:   08/24/22 121/82   02/04/22 (!) 138/91   12/17/19 (!) 142/84       Lab Results   Component Value Date    A1C 7.4 12/17/2019       No results for input(s): CHOL, HDL, LDL, TRIG, CHOLHDLRATIO in the last 22984 hours.    No results found for: MICROL  No results found for: MICROALBUMIN      Wt Readings from Last 3 Encounters:   08/24/22 98.7 kg (217 lb 9.6 oz)   02/04/22 97.8 kg (215 lb 11.2 oz)   12/17/19 98.4 kg (217 lb)       Current Outpatient Medications   Medication Sig Dispense Refill     ADDERALL XR 15 MG 24 hr capsule        dexamethasone (DECADRON) 1 MG tablet Take 1 tablet (1 mg) by mouth once for 1 dose 1 tablet 0     fluticasone (FLONASE) 50 MCG/ACT nasal spray Spray 1-2 sprays into both nostrils daily 1 Bottle 3     hydrOXYzine (ATARAX) 10 MG tablet Take 1-5 tablets (10-50 mg) by mouth every 6 hours as needed for anxiety 60 tablet 2     levonorgestrel (MIRENA) 20 MCG/24HR IUD 1 Device by Intrauterine route       metFORMIN (GLUCOPHAGE XR) 500 MG 24 hr tablet Take 2 tablets (1,000 mg) by mouth 2 times daily (with meals) 360 tablet 3     propranolol (INDERAL) 20 MG tablet        semaglutide (OZEMPIC) 2 MG/1.5ML SOPN pen Inject 0.25 mg subcutaneous weekly x 4 weeks, then 0.5 mg weekly 3 mL 1     albuterol (PROAIR HFA/PROVENTIL HFA/VENTOLIN HFA) 108 (90 Base) MCG/ACT inhaler Inhale 2 puffs into the lungs every 6 hours (Patient not taking: No sig reported) 8.5 g 0     oxymetazoline (AFRIN NASAL SPRAY) 0.05 % nasal spray Spray 2-3 sprays into both nostrils 2 times daily as needed for congestion (Patient not taking: No sig reported) 1 Bottle 0      temazepam (RESTORIL) 15 MG capsule Take by mouth nightly as needed (Patient not taking: Reported on 2/4/2022)         Histories reviewed and updated in Epic.  Past Medical History:   Diagnosis Date     Incompetences, cervix 2004, 2006    cerclage 2004     MS (multiple sclerosis) (H) 7/2008       Past Surgical History:   Procedure Laterality Date     TUBAL LIGATION  4/2006       Allergies:  Adhesive tape and Shellfish allergy    Social History     Tobacco Use     Smoking status: Never Smoker     Smokeless tobacco: Never Used   Substance Use Topics     Alcohol use: Yes       Family History   Family history unknown: Yes          REVIEW OF SYSTEMS:   ROS: 10 point ROS neg other than the symptoms noted above in the HPI.      EXAM:  Vitals: /82 (BP Location: Right arm, Patient Position: Sitting, Cuff Size: Adult Large)   Pulse 74   Wt 98.7 kg (217 lb 9.6 oz)   SpO2 98%   BMI 35.12 kg/m      BMIE= Body mass index is 35.12 kg/m .  Exam:  Constitutional: healthy, alert, no acute distress  Head: Normocephalic. No masses, lesions, no exophthalmos/proptosis  ENT: enlarged thyroid approx 45g, no palpable nodules, no cervical lymph nodes, sig acanthosis, mild prominence of dorsocervical fat pad  Respiratory: nonlabored  Gastrointestinal: Abdomen soft, non-tender.  Musculoskeletal: extremities normal- no gross deformities noted, gait normal and normal muscle tone  Skin: no suspicious lesions or rashes  Neurologic: Gait normal. sensation grossly intact  Psychiatric: mentation appears normal, calm  Foot Exam: normal DP and PT pulses, no trophic changes or ulcerative lesions, normal sensory exam and normal monofilament exam    ASSESSMENT/PLAN:  No diagnosis found.  Orders Placed This Encounter   Procedures     Albumin Random Urine Quantitative with Creat Ratio     Lipid panel reflex to direct LDL Fasting     Basic metabolic panel  (Ca, Cl, CO2, Creat, Gluc, K, Na, BUN)     Vitamin D Deficiency     TSH with free T4  "reflex     Cortisol     Adrenal corticotropin     Estradiol     17 OH progesterone     Follicle stimulating hormone     Luteinizing Hormone, Adult     Internal Medicine Referral     Adult Eye  Referral     AMB Adult Diabetes Educator Referral     Testosterone Free and Total       1) Diabetes Mellitus NOT at goal  - Glucose Control-based on Dexcom average blood sugar 200 correlates with A1c approximately 9    - START Metformin. Start taking metformin 500 mg once daily.    After one week, if no symptoms increase to 500 mg twice daily  After one week, if no symptoms increase to 1000 mg (2 pills) in the morning, 500 mg at night  After one week, if no symptoms increase to 1000 mg (2 pills ) twice daily    - START Ozempic- start in 4 weeks after metformin is to goal, pending insurance approval   - RX for Dexcom, which she is currently using/taking  - Recheck labs for all DM maintenannce  - Referred to DM education  - Reffered to neurophthalmology    2)Fatigue  - Check dex suppression test (previous cortisol testing was \"off the charts high\")  - Check estradiol, FSH, LH, testosterone, vitamin d    3) Enlarged Thyroid  - Thyroid ultrasound  - Repeat TFTs    RTC- 3 months      A total of 60 minutes were spent today 08/24/22 on this visit including chart review, history and counseling, documentation and other activities as detailed above.       "

## 2022-08-24 NOTE — LETTER
8/24/2022         RE: Radha Alvarez  [address]  [place] [address]        Dear Colleague,    Thank you for referring your patient, Radha Alvarez, to the [facility] [place]. Please see a copy of my visit note below.    Radha Alvarez is a 46 year old yo female here to establish care of Diabetes Mellitus. Also with PMHx of MS (Dx 2009). Never seen endo before in past    Over past year, has felt that overall her health is declining and ready to get a jumpstart.  Feels that she is gaining or at least not able to lose weight, overall feels unwell has worsening fatigue etc.  Hoping this visit can jumpstart control of her diabetes and getting her health back on track      1) Diabetes Mellitus    Diabetes History:  Diagnosis: 2 years ago with pre-diabetes, made lifestyle changes and got into prediabetes range. Noted blurry/double vision and BG was elevated.   Hospitalizations: none  Previous Regimens: Tried dexcom but was allergic to adhesive  Current Regimen: none    BG check- DEXCOM Only wearing last 3 days  Trends-         Complications: none    Last eye exam: not recently  Foot Exam: Completed today, no neuropathy  ACEI/ARB: Not currently indicated but no recent DEE  Statin/ASA: Not currently indicated but no recent lipid panel    2) Fatigue, weight gain  Currently experiecing blurry vision, extreme fatigue, headaches, minimal appetite, trouble focusing/brain fog.  Regular periods prior to mirena but with some breakthrough bleeding (prior to placement), no bleeding now  Notes difficulty losing weight-- eating balanced diet/gym 3x weekly has always been able to lose weight and now feels that is long process to lose 5 lbs  HPA-       - Deficit: occasional lightheadedness/dizziness      - Excess: notes easy bruising, denies poor healing, weight gain/difficulty losing weight  HPT-       - Deficit: denies cold intolerance, constipation, notes fatigue,        - Excess:denies heat  intolerance, notes occasional diarrhea/tenesmus, notes hair loss (temples/center), notes all anxiety, palpations, tremor  HPG- experiencing night sweats/feeling hot overnight between 3-5a  - denies polyuria/polydypsia, nocturia  GH- denies change in hat/ring/shoe size, denies change in energy level  Prolactin- none change in energy level, denies change in menses, denies galactorrhea    Denies vision changes including blurry vision, double vision or change in peripheral vision. Denies headaches.      6/4/2021- [facility]  A1C 6.1%  TSH 1.28  FT4 1.09  ESR 7  CRP <0.3             BP Readings from Last 3 Encounters:   08/24/22 121/82   02/04/22 (!) 138/91   12/17/19 (!) 142/84       Lab Results   Component Value Date    A1C 7.4 12/17/2019       No results for input(s): CHOL, HDL, LDL, TRIG, CHOLHDLRATIO in the last 97315 hours.    No results found for: MICROL  No results found for: MICROALBUMIN      Wt Readings from Last 3 Encounters:   08/24/22 98.7 kg (217 lb 9.6 oz)   02/04/22 97.8 kg (215 lb 11.2 oz)   12/17/19 98.4 kg (217 lb)       Current Outpatient Medications   Medication Sig Dispense Refill     ADDERALL XR 15 MG 24 hr capsule        dexamethasone (DECADRON) 1 MG tablet Take 1 tablet (1 mg) by mouth once for 1 dose 1 tablet 0     fluticasone (FLONASE) 50 MCG/ACT nasal spray Spray 1-2 sprays into both nostrils daily 1 Bottle 3     hydrOXYzine (ATARAX) 10 MG tablet Take 1-5 tablets (10-50 mg) by mouth every 6 hours as needed for anxiety 60 tablet 2     levonorgestrel (MIRENA) 20 MCG/24HR IUD 1 Device by Intrauterine route       metFORMIN (GLUCOPHAGE XR) 500 MG 24 hr tablet Take 2 tablets (1,000 mg) by mouth 2 times daily (with meals) 360 tablet 3     propranolol (INDERAL) 20 MG tablet        semaglutide (OZEMPIC) 2 MG/1.5ML SOPN pen Inject 0.25 mg subcutaneous weekly x 4 weeks, then 0.5 mg weekly 3 mL 1     albuterol (PROAIR HFA/PROVENTIL HFA/VENTOLIN HFA) 108 (90 Base) MCG/ACT inhaler Inhale 2 puffs into  the lungs every 6 hours (Patient not taking: No sig reported) 8.5 g 0     oxymetazoline (AFRIN NASAL SPRAY) 0.05 % nasal spray Spray 2-3 sprays into both nostrils 2 times daily as needed for congestion (Patient not taking: No sig reported) 1 Bottle 0     temazepam (RESTORIL) 15 MG capsule Take by mouth nightly as needed (Patient not taking: Reported on 2/4/2022)         Histories reviewed and updated in Epic.  Past Medical History:   Diagnosis Date     Incompetences, cervix 2004, 2006    cerclage 2004     MS (multiple sclerosis) (H) 7/2008       Past Surgical History:   Procedure Laterality Date     TUBAL LIGATION  4/2006       Allergies:  Adhesive tape and Shellfish allergy    Social History     Tobacco Use     Smoking status: Never Smoker     Smokeless tobacco: Never Used   Substance Use Topics     Alcohol use: Yes       Family History   Family history unknown: Yes          REVIEW OF SYSTEMS:   ROS: 10 point ROS neg other than the symptoms noted above in the HPI.      EXAM:  Vitals: /82 (BP Location: Right arm, Patient Position: Sitting, Cuff Size: Adult Large)   Pulse 74   Wt 98.7 kg (217 lb 9.6 oz)   SpO2 98%   BMI 35.12 kg/m      BMIE= Body mass index is 35.12 kg/m .  Exam:  Constitutional: healthy, alert, no acute distress  Head: Normocephalic. No masses, lesions, no exophthalmos/proptosis  ENT: enlarged thyroid approx 45g, no palpable nodules, no cervical lymph nodes, sig acanthosis, mild prominence of dorsocervical fat pad  Respiratory: nonlabored  Gastrointestinal: Abdomen soft, non-tender.  Musculoskeletal: extremities normal- no gross deformities noted, gait normal and normal muscle tone  Skin: no suspicious lesions or rashes  Neurologic: Gait normal. sensation grossly intact  Psychiatric: mentation appears normal, calm  Foot Exam: normal DP and PT pulses, no trophic changes or ulcerative lesions, normal sensory exam and normal monofilament exam    ASSESSMENT/PLAN:  No diagnosis found.  Orders  "Placed This Encounter   Procedures     Albumin Random Urine Quantitative with Creat Ratio     Lipid panel reflex to direct LDL Fasting     Basic metabolic panel  (Ca, Cl, CO2, Creat, Gluc, K, Na, BUN)     Vitamin D Deficiency     TSH with free T4 reflex     Cortisol     Adrenal corticotropin     Estradiol     17 OH progesterone     Follicle stimulating hormone     Luteinizing Hormone, Adult     Internal Medicine Referral     Adult Eye  Referral     AMB Adult Diabetes Educator Referral     Testosterone Free and Total       1) Diabetes Mellitus NOT at goal  - Glucose Control-based on Dexcom average blood sugar 200 correlates with A1c approximately 9    - START Metformin. Start taking metformin 500 mg once daily.    After one week, if no symptoms increase to 500 mg twice daily  After one week, if no symptoms increase to 1000 mg (2 pills) in the morning, 500 mg at night  After one week, if no symptoms increase to 1000 mg (2 pills ) twice daily    - START Ozempic- start in 4 weeks after metformin is to goal, pending insurance approval   - RX for Dexcom, which she is currently using/taking  - Recheck labs for all DM maintenannce  - Referred to DM education  - Reffered to neurophthalmology    2)Fatigue  - Check dex suppression test (previous cortisol testing was \"off the charts high\")  - Check estradiol, FSH, LH, testosterone, vitamin d    3) Enlarged Thyroid  - Thyroid ultrasound  - Repeat TFTs    RTC- 3 months      A total of 60 minutes were spent today 08/24/22 on this visit including chart review, history and counseling, documentation and other activities as detailed above.           Again, thank you for allowing me to participate in the care of your patient.        Sincerely,        Radha Gayle MD    "

## 2022-08-24 NOTE — PATIENT INSTRUCTIONS
Glucose goals according to the American Diabetes Association:  --Pre-meal (including fasting, before meals):  mg/dl  --2 hours after eating: <180 mg/dl, ideally 100-150 mg/dl      1) Metformin start  Start taking metformin 500 mg once daily.    After one week, if no symptoms increase to 500 mg twice daily  After one week, if no symptoms increase to 1000 mg (2 pills) in the morning, 500 mg at night  After one week, if no symptoms increase to 1000 mg (2 pills ) twice daily     If you develop any symptoms of abdominal pain, bloating, or loose bowel movements, let me know    2) After at target to metformin, then start Ozempic  - Start Ozempic 0.25 x4 weeks then increase to 0.5mg weekly   - Reviewed common side effects of nausea/bloating, slow food moving through stomach, cramping abdominal pain    3) Dexamethasone suppression test  Dexamethasone Suppression Test instructions  1) Please take a single pill of 1 mg of dexamethasone at 11:00 pm.     2) The next morning go to the laboratory at 8:00 am for a blood draw for cortisol, ACTH testing

## 2022-08-25 ENCOUNTER — TELEPHONE (OUTPATIENT)
Dept: ENDOCRINOLOGY | Facility: CLINIC | Age: 47
End: 2022-08-25

## 2022-08-25 LAB
ACTH PLAS-MCNC: 14 PG/ML
CORTIS SERPL-MCNC: 6.4 UG/DL
DEPRECATED CALCIDIOL+CALCIFEROL SERPL-MC: 15 UG/L (ref 20–75)
ESTRADIOL SERPL-MCNC: 13 PG/ML
FSH SERPL IRP2-ACNC: 15.9 MIU/ML
LH SERPL-ACNC: 11.5 MIU/ML
SHBG SERPL-SCNC: 29 NMOL/L (ref 30–135)

## 2022-08-25 NOTE — TELEPHONE ENCOUNTER
Diabetes Education Scheduling Outreach #1:    Call to patient to schedule. Left message with phone number to call to schedule.    Also sent [employer] message for second attempt. Requested patient to call to schedule.    Romy Dejesus OnCall  Diabetes and Nutrition Scheduling

## 2022-08-25 NOTE — TELEPHONE ENCOUNTER
Central Prior Authorization Team   Phone: [phone]      Prior Authorization Approval    Authorization Effective Date: 8/25/2022  Authorization Expiration Date: 8/25/2023  Medication: OZEMPIC 2 MG/1.5ML SOPN pen--APPROVED  Approved Dose/Quantity:    Reference #:     Insurance Company: [employer] ([facility]) - Phone [phone] Fax [phone]  Expected CoPay:       CoPay Card Available:      Foundation Assistance Needed:    Which Pharmacy is filling the prescription (Not needed for infusion/clinic administered): [id number] - [place], [place] - [address] AT [address]  Pharmacy Notified: Yes  Patient Notified: Yes PHARMACY WILL CONTACT WHEN FILLED

## 2022-08-25 NOTE — TELEPHONE ENCOUNTER
Central Prior Authorization Team   Phone: [phone]      PA Initiation    Medication: OZEMPIC 2 MG/1.5ML SOPN pen--INITIATED  Insurance Company: Kevin ([facility]) - Phone [phone] Fax [phone]  Pharmacy Filling the Rx: [id number] - [place], [place] - [address] AT [facility] [address]  Filling Pharmacy Phone: [phone]  Filling Pharmacy Fax:    Start Date: 8/25/2022

## 2022-08-26 NOTE — TELEPHONE ENCOUNTER
PRIOR AUTHORIZATION DENIED    Medication: Dexcom G6 [employer]mit[employer] - PA Denied     Denial Date: 8/24/2022    Denial Rational:           Appeal Information:

## 2022-08-28 LAB
TESTOST FREE SERPL-MCNC: 0.23 NG/DL
TESTOST SERPL-MCNC: 12 NG/DL (ref 8–60)

## 2022-08-30 LAB — 17OHP SERPL-MCNC: 11 NG/DL

## 2022-08-31 DIAGNOSIS — E11.9 TYPE 2 DIABETES MELLITUS WITHOUT COMPLICATION, WITHOUT LONG-TERM CURRENT USE OF INSULIN (H): Primary | ICD-10-CM

## 2022-08-31 NOTE — TELEPHONE ENCOUNTER
Good afternoon! Just following up with this one to see if the plan will be to appeal or not. Thank you!

## 2022-08-31 NOTE — RESULT ENCOUNTER NOTE
Dear Radha,     Here are your recent results -- Your A1C was not checked, so I just reordered this.   Regardless, your blood glucose is >200, which is consistent with overt diabetes not prediabetes.     Vitamin D level is low-- please take 4000u daily (Vitamin D3 is available over the counter)- for 8 weeks and we'll recheck. This definitely can cause fatigue symptoms    The rest of your hormones look ok, nothing to explain your symptoms.      Regards,  Radha Gayle MD

## 2022-09-14 NOTE — TELEPHONE ENCOUNTER
Rishabh Gil! I see that there has been communication with the patient regarding the Dexcom G6 coverage. I can't see the messages though. I was wondering if you had an update? Thank you!

## 2022-09-19 ENCOUNTER — LAB (OUTPATIENT)
Dept: LAB | Facility: CLINIC | Age: 47
End: 2022-09-19
Payer: COMMERCIAL

## 2022-09-19 DIAGNOSIS — E11.9 TYPE 2 DIABETES MELLITUS WITHOUT COMPLICATION, WITHOUT LONG-TERM CURRENT USE OF INSULIN (H): ICD-10-CM

## 2022-09-19 LAB — HBA1C MFR BLD: 7.7 % (ref 0–5.6)

## 2022-09-19 PROCEDURE — 83036 HEMOGLOBIN GLYCOSYLATED A1C: CPT

## 2022-09-19 PROCEDURE — 36415 COLL VENOUS BLD VENIPUNCTURE: CPT

## 2022-09-22 NOTE — TELEPHONE ENCOUNTER
Good afternoon! Just wondering if there is any update on if the plan will be to appeal this or not? Thank you

## 2022-10-23 ENCOUNTER — HEALTH MAINTENANCE LETTER (OUTPATIENT)
Age: 47
End: 2022-10-23

## 2022-10-26 ENCOUNTER — MYC MEDICAL ADVICE (OUTPATIENT)
Dept: ENDOCRINOLOGY | Facility: CLINIC | Age: 47
End: 2022-10-26

## 2022-10-26 DIAGNOSIS — E11.9 TYPE 2 DIABETES MELLITUS WITHOUT COMPLICATION, WITHOUT LONG-TERM CURRENT USE OF INSULIN (H): Primary | ICD-10-CM

## 2022-10-28 ENCOUNTER — ANCILLARY PROCEDURE (OUTPATIENT)
Dept: ULTRASOUND IMAGING | Facility: CLINIC | Age: 47
End: 2022-10-28
Attending: INTERNAL MEDICINE
Payer: COMMERCIAL

## 2022-10-28 DIAGNOSIS — E04.9 GOITER: ICD-10-CM

## 2022-10-28 PROCEDURE — 76536 US EXAM OF HEAD AND NECK: CPT | Performed by: RADIOLOGY

## 2022-11-14 ASSESSMENT — ANXIETY QUESTIONNAIRES
2. NOT BEING ABLE TO STOP OR CONTROL WORRYING: MORE THAN HALF THE DAYS
GAD7 TOTAL SCORE: 13
4. TROUBLE RELAXING: MORE THAN HALF THE DAYS
1. FEELING NERVOUS, ANXIOUS, OR ON EDGE: NEARLY EVERY DAY
IF YOU CHECKED OFF ANY PROBLEMS ON THIS QUESTIONNAIRE, HOW DIFFICULT HAVE THESE PROBLEMS MADE IT FOR YOU TO DO YOUR WORK, TAKE CARE OF THINGS AT HOME, OR GET ALONG WITH OTHER PEOPLE: VERY DIFFICULT
6. BECOMING EASILY ANNOYED OR IRRITABLE: MORE THAN HALF THE DAYS
5. BEING SO RESTLESS THAT IT IS HARD TO SIT STILL: SEVERAL DAYS
7. FEELING AFRAID AS IF SOMETHING AWFUL MIGHT HAPPEN: SEVERAL DAYS
3. WORRYING TOO MUCH ABOUT DIFFERENT THINGS: MORE THAN HALF THE DAYS

## 2022-11-14 ASSESSMENT — PATIENT HEALTH QUESTIONNAIRE - PHQ9: SUM OF ALL RESPONSES TO PHQ QUESTIONS 1-9: 17

## 2022-11-20 ASSESSMENT — PATIENT HEALTH QUESTIONNAIRE - PHQ9
SUM OF ALL RESPONSES TO PHQ QUESTIONS 1-9: 17
10. IF YOU CHECKED OFF ANY PROBLEMS, HOW DIFFICULT HAVE THESE PROBLEMS MADE IT FOR YOU TO DO YOUR WORK, TAKE CARE OF THINGS AT HOME, OR GET ALONG WITH OTHER PEOPLE: VERY DIFFICULT

## 2022-11-22 ENCOUNTER — VIRTUAL VISIT (OUTPATIENT)
Dept: ENDOCRINOLOGY | Facility: CLINIC | Age: 47
End: 2022-11-22
Payer: COMMERCIAL

## 2022-11-22 VITALS — BODY MASS INDEX: 33.48 KG/M2 | WEIGHT: 207.4 LBS

## 2022-11-22 DIAGNOSIS — E55.9 VITAMIN D DEFICIENCY: ICD-10-CM

## 2022-11-22 DIAGNOSIS — E11.9 TYPE 2 DIABETES MELLITUS WITHOUT COMPLICATION, WITHOUT LONG-TERM CURRENT USE OF INSULIN (H): Primary | ICD-10-CM

## 2022-11-22 PROCEDURE — 99214 OFFICE O/P EST MOD 30 MIN: CPT | Mod: GT | Performed by: INTERNAL MEDICINE

## 2022-11-22 RX ORDER — ERGOCALCIFEROL 1.25 MG/1
50000 CAPSULE, LIQUID FILLED ORAL WEEKLY
Qty: 8 CAPSULE | Refills: 1 | Status: SHIPPED | OUTPATIENT
Start: 2022-11-22

## 2022-11-22 NOTE — PATIENT INSTRUCTIONS
Glucose goals according to the American Diabetes Association:  --Pre-meal (including fasting, before meals):  mg/dl  --2 hours after eating: <180 mg/dl, ideally 100-150 mg/dl

## 2022-11-22 NOTE — PROGRESS NOTES
Video-Visit Details    Type of service:  Video Visit  Video Start Time:69:02  Video End Time:9:25  Originating Location (pt. Location): [place], [place]  Distant Location (provider location):  Home  Platform used for Video Visit: Zahraa Gayle MD    Radha Alvarez is a 46 year old year old female here for evaluation of dm2 via a billable video visit.        Radha Alvarez is a 46 year old yo female here to for follow-up care of Diabetes Mellitus. Also with PMHx of MS (Dx 2009).    Interval History:  - Lost transmitter, almost at 90 days to get a new one,  - Had some adhesive issues, but working through this  - After last 1-2 doses of 0.5 starting to see weight loss approx 1lb per week, no nausea   - Overall feeling dramatically better, waking up feeling better, less/no blurry vision  - Scheduled neuroophthalmology in December   - [place] ill with metformin so stopped.      1) Diabetes Mellitus    Diabetes History:  Diagnosis: 2 years ago with pre-diabetes, made lifestyle changes and got into prediabetes range. Noted blurry/double vision and BG was elevated.   Hospitalizations: none  Previous Regimens: Tried dexcom but was allergic to adhesive, metformin (nausea/bloating/diarrhea)  Current Regimen: Ozempic- 0.5mg weekly,     BG check- DEXCOM Only wearing 3 days-- lost transmitter since 10/27, was confirming with fingerstick and noticing discordance  140 on SMBG and 200 on Dexcom  Only checking SMBG when feels off/poorly, but was noticing 120s-140s  Trends- (3 days only)        Complications: none    Last eye exam: not recently  Foot Exam: Completed today, no neuropathy  ACEI/ARB: Not currently indicated but no recent DEE  Statin/ASA: Not currently indicated but no recent lipid panel    2) Fatigue, weight gain  HPI: Over previous 6-12 months, Currently experiecing blurry vision, extreme fatigue, headaches, minimal appetite, trouble focusing/brain fog.  Regular periods prior to mirena but with some breakthrough  bleeding (prior to placement), no bleeding now Notes difficulty losing weight-- eating balanced diet/gym 3x weekly has always been able to lose weight and now feels that is long process to lose 5 lbs    INTERVAL HISTORY- feels much better with improvement in blood sugar, starting of ozempic  Just starting to see some weight loss    3) Vit D Deficiency  Not taking regularly       BP Readings from Last 3 Encounters:   08/24/22 121/82   02/04/22 (!) 138/91   12/17/19 (!) 142/84       Lab Results   Component Value Date    A1C 7.4 12/17/2019       No results for input(s): CHOL, HDL, LDL, TRIG, CHOLHDLRATIO in the last 61744 hours.    No results found for: MICROL  No results found for: MICROALBUMIN      Wt Readings from Last 3 Encounters:   11/22/22 94.1 kg (207 lb 6.4 oz)   08/24/22 98.7 kg (217 lb 9.6 oz)   02/04/22 97.8 kg (215 lb 11.2 oz)       Current Outpatient Medications   Medication Sig Dispense Refill     fluticasone (FLONASE) 50 MCG/ACT nasal spray Spray 1-2 sprays into both nostrils daily 1 Bottle 3     hydrOXYzine (ATARAX) 10 MG tablet Take 1-5 tablets (10-50 mg) by mouth every 6 hours as needed for anxiety 60 tablet 0     levonorgestrel (MIRENA) 20 MCG/24HR IUD 1 Device by Intrauterine route       propranolol (INDERAL) 20 MG tablet Take 1 tablet (20 mg) by mouth daily as needed (Anxiety) 30 tablet 0     Semaglutide, 1 MG/DOSE, 4 MG/3ML SOPN Inject 1 mg Subcutaneous once a week 9 mL 3     vitamin D2 (ERGOCALCIFEROL) 84096 units (1250 mcg) capsule Take 1 capsule (50,000 Units) by mouth once a week 8 capsule 1     ADDERALL XR 15 MG 24 hr capsule        blood glucose (NO BRAND SPECIFIED) lancets standard Use to test blood sugar 4 times daily or as directed. 200 each 4     blood glucose (NO BRAND SPECIFIED) test strip Use to test blood sugar 4 times daily or as directed. 200 strip 3     Continuous Blood Gluc Sensor (DEXCOM G6 SENSOR) MISC 1 Device continuous prn (Use for continuous glucose testing, change every  10-days) Dispense 3 box (of 3 sensors) per 3 months 3 each 5     Continuous Blood Gluc Transmit (DEXCOM G6 TRANSMITTER) MISC 1 Device continuous prn (use for continuous glucose testing, change every 90 days) 1 each 3       Histories reviewed and updated in Epic.  Past Medical History:   Diagnosis Date     Incompetences, cervix 2004, 2006    cerclage 2004     MS (multiple sclerosis) (H) 7/2008       Past Surgical History:   Procedure Laterality Date     TUBAL LIGATION  4/2006       Allergies:  Adhesive tape and Shellfish allergy    Social History     Tobacco Use     Smoking status: Never     Smokeless tobacco: Never   Substance Use Topics     Alcohol use: Yes       Family History   Family history unknown: Yes          REVIEW OF SYSTEMS:   ROS: 10 point ROS neg other than the symptoms noted above in the HPI.      EXAM:  Vitals: Wt 94.1 kg (207 lb 6.4 oz)   BMI 33.48 kg/m      BMIE= Body mass index is 33.48 kg/m .  Physical Exam (visual exam)  VS:  no vital signs taken for video visit  CONSTITUTIONAL: healthy, alert and NAD, responding appropriately  ENT: normocephalic, no visual evidence of trauma, normal nose and oral mucosa  EYES: conjunctivae and sclerae normal, no exophthalmos or proptosis  THYROID:  no visualized nodules or goiter  LUNGS: no audible wheeze, cough or visible cyanosis, no visible retractions or increased work of breathing  EXTREMITIES: no hand tremors  NEUROLOGY: cranial nerves grossly intact with no obvious deficit.  SKIN:  no visualized skin lesions or rash, no edema visualized  PSYCH: mentation appears normal, normal judgement      ASSESSMENT/PLAN:  No diagnosis found.  Orders Placed This Encounter   Procedures     Lipid panel reflex to direct LDL Fasting     Hemoglobin A1c       1) Diabetes Mellitus NOT at goal  - Glucose Control-based on Dexcom average blood sugar 200 correlates with A1c approximately 9    - Increase Ozempic to 1mg   - Did not tolerate metformin   - Continue Dexcom, which she  is currently using/taking  - Nephrology- DEE wnl, recheck August 2022  - Lipid- may consider statin at next visit, trial of lifestyle, recheck FLP with next labs  - Recheck A1C  - Referred to DM education  - Reffered to neurophthalmology-- apt Dec 2022    2)Fatigue  - improved, did not complete accurate dex suppression but will not repeat given improvement in symptoms at this time    3) Vit D deficiency  - 69169u weekly x8 weeks    3) Goiter, asymmetric thyroid  - Thyroid ultrasound repeat Oct 2023  - Repeat TFT annually (given fam history)    RTC- 3 months    A total of 32 minutes were spent today 11/22/22 on this visit including chart review, history and counseling, documentation and other activities as detailed above.

## 2022-11-22 NOTE — LETTER
11/22/2022         RE: Radha Alvarez  [address]  [place] [address]        Dear Colleague,    Thank you for referring your patient, Radha Alvarez, to the [facility] [place]. Please see a copy of my visit note below.      Video-Visit Details    Type of service:  Video Visit  Video Start Time:69:02  Video End Time:9:25  Originating Location (pt. Location): [place], [place]  Distant Location (provider location):  Home  Platform used for Video Visit: Zahraa Gayle MD    Radha Alvarez is a 46 year old year old female here for evaluation of dm2 via a billable video visit.        Radha Alvarez is a 46 year old yo female here to for follow-up care of Diabetes Mellitus. Also with PMHx of MS (Dx 2009).    Interval History:  - Lost transmitter, almost at 90 days to get a new one,  - Had some adhesive issues, but working through this  - After last 1-2 doses of 0.5 starting to see weight loss approx 1lb per week, no nausea   - Overall feeling dramatically better, waking up feeling better, less/no blurry vision  - Scheduled neuroophthalmology in December   - [place] ill with metformin so stopped.      1) Diabetes Mellitus    Diabetes History:  Diagnosis: 2 years ago with pre-diabetes, made lifestyle changes and got into prediabetes range. Noted blurry/double vision and BG was elevated.   Hospitalizations: none  Previous Regimens: Tried dexcom but was allergic to adhesive, metformin (nausea/bloating/diarrhea)  Current Regimen: Ozempic- 0.5mg weekly,     BG check- DEXCOM Only wearing 3 days-- lost transmitter since 10/27, was confirming with fingerstick and noticing discordance  140 on SMBG and 200 on Dexcom  Only checking SMBG when feels off/poorly, but was noticing 120s-140s  Trends- (3 days only)        Complications: none    Last eye exam: not recently  Foot Exam: Completed today, no neuropathy  ACEI/ARB: Not currently indicated but no recent DEE  Statin/ASA: Not currently  indicated but no recent lipid panel    2) Fatigue, weight gain  HPI: Over previous 6-12 months, Currently experiecing blurry vision, extreme fatigue, headaches, minimal appetite, trouble focusing/brain fog.  Regular periods prior to mirena but with some breakthrough bleeding (prior to placement), no bleeding now Notes difficulty losing weight-- eating balanced diet/gym 3x weekly has always been able to lose weight and now feels that is long process to lose 5 lbs    INTERVAL HISTORY- feels much better with improvement in blood sugar, starting of ozempic  Just starting to see some weight loss    3) Vit D Deficiency  Not taking regularly       BP Readings from Last 3 Encounters:   08/24/22 121/82   02/04/22 (!) 138/91   12/17/19 (!) 142/84       Lab Results   Component Value Date    A1C 7.4 12/17/2019       No results for input(s): CHOL, HDL, LDL, TRIG, CHOLHDLRATIO in the last 37228 hours.    No results found for: MICROL  No results found for: MICROALBUMIN      Wt Readings from Last 3 Encounters:   11/22/22 94.1 kg (207 lb 6.4 oz)   08/24/22 98.7 kg (217 lb 9.6 oz)   02/04/22 97.8 kg (215 lb 11.2 oz)       Current Outpatient Medications   Medication Sig Dispense Refill     fluticasone (FLONASE) 50 MCG/ACT nasal spray Spray 1-2 sprays into both nostrils daily 1 Bottle 3     hydrOXYzine (ATARAX) 10 MG tablet Take 1-5 tablets (10-50 mg) by mouth every 6 hours as needed for anxiety 60 tablet 0     levonorgestrel (MIRENA) 20 MCG/24HR IUD 1 Device by Intrauterine route       propranolol (INDERAL) 20 MG tablet Take 1 tablet (20 mg) by mouth daily as needed (Anxiety) 30 tablet 0     Semaglutide, 1 MG/DOSE, 4 MG/3ML SOPN Inject 1 mg Subcutaneous once a week 9 mL 3     vitamin D2 (ERGOCALCIFEROL) 03671 units (1250 mcg) capsule Take 1 capsule (50,000 Units) by mouth once a week 8 capsule 1     ADDERALL XR 15 MG 24 hr capsule        blood glucose (NO BRAND SPECIFIED) lancets standard Use to test blood sugar 4 times daily or as  directed. 200 each 4     blood glucose (NO BRAND SPECIFIED) test strip Use to test blood sugar 4 times daily or as directed. 200 strip 3     Continuous Blood Gluc Sensor (DEXCOM G6 SENSOR) MISC 1 Device continuous prn (Use for continuous glucose testing, change every 10-days) Dispense 3 box (of 3 sensors) per 3 months 3 each 5     Continuous Blood Gluc Transmit (DEXCOM G6 TRANSMITTER) MISC 1 Device continuous prn (use for continuous glucose testing, change every 90 days) 1 each 3       Histories reviewed and updated in Epic.  Past Medical History:   Diagnosis Date     Incompetences, cervix 2004, 2006    cerclage 2004     MS (multiple sclerosis) (H) 7/2008       Past Surgical History:   Procedure Laterality Date     TUBAL LIGATION  4/2006       Allergies:  Adhesive tape and Shellfish allergy    Social History     Tobacco Use     Smoking status: Never     Smokeless tobacco: Never   Substance Use Topics     Alcohol use: Yes       Family History   Family history unknown: Yes          REVIEW OF SYSTEMS:   ROS: 10 point ROS neg other than the symptoms noted above in the HPI.      EXAM:  Vitals: Wt 94.1 kg (207 lb 6.4 oz)   BMI 33.48 kg/m      BMIE= Body mass index is 33.48 kg/m .  Physical Exam (visual exam)  VS:  no vital signs taken for video visit  CONSTITUTIONAL: healthy, alert and NAD, responding appropriately  ENT: normocephalic, no visual evidence of trauma, normal nose and oral mucosa  EYES: conjunctivae and sclerae normal, no exophthalmos or proptosis  THYROID:  no visualized nodules or goiter  LUNGS: no audible wheeze, cough or visible cyanosis, no visible retractions or increased work of breathing  EXTREMITIES: no hand tremors  NEUROLOGY: cranial nerves grossly intact with no obvious deficit.  SKIN:  no visualized skin lesions or rash, no edema visualized  PSYCH: mentation appears normal, normal judgement      ASSESSMENT/PLAN:  No diagnosis found.  Orders Placed This Encounter   Procedures     Lipid panel  reflex to direct LDL Fasting     Hemoglobin A1c       1) Diabetes Mellitus NOT at goal  - Glucose Control-based on Dexcom average blood sugar 200 correlates with A1c approximately 9    - Increase Ozempic to 1mg   - Did not tolerate metformin   - Continue Dexcom, which she is currently using/taking  - Nephrology- DEE wnl, recheck August 2022  - Lipid- may consider statin at next visit, trial of lifestyle, recheck FLP with next labs  - Recheck A1C  - Referred to DM education  - Reffered to neurophthalmology-- apt Dec 2022    2)Fatigue  - improved, did not complete accurate dex suppression but will not repeat given improvement in symptoms at this time    3) Vit D deficiency  - 38920y weekly x8 weeks    3) Goiter, asymmetric thyroid  - Thyroid ultrasound repeat Oct 2023  - Repeat TFT annually (given fam history)    RTC- 3 months    A total of 32 minutes were spent today 11/22/22 on this visit including chart review, history and counseling, documentation and other activities as detailed above.         Again, thank you for allowing me to participate in the care of your patient.        Sincerely,        Radha Gayle MD

## 2022-11-23 ENCOUNTER — TELEPHONE (OUTPATIENT)
Dept: ENDOCRINOLOGY | Facility: CLINIC | Age: 47
End: 2022-11-23

## 2022-11-23 NOTE — TELEPHONE ENCOUNTER
JUN and [employer] message with a f/u with Dr. Gayle around 3 months virtually.  Call back [phone]

## 2022-11-30 NOTE — TELEPHONE ENCOUNTER
JUN and [employer] message (2nd) with a f/u with Dr. Gayle around 3 months virtually. Call back [phone]

## 2022-12-01 ENCOUNTER — IMMUNIZATION (OUTPATIENT)
Dept: NURSING | Facility: CLINIC | Age: 47
End: 2022-12-01
Payer: COMMERCIAL

## 2022-12-01 PROCEDURE — 91313 COVID-19 VACCINE BIVALENT BOOSTER 18+ (MODERNA): CPT

## 2022-12-01 PROCEDURE — 0134A COVID-19 VACCINE BIVALENT BOOSTER 18+ (MODERNA): CPT

## 2022-12-06 NOTE — TELEPHONE ENCOUNTER
JUN and [employer] message (2nd) with a f/u with Dr. Gayle around 3 months virtually. Call back [phone]

## 2022-12-11 NOTE — PROGRESS NOTES
Radha Alvarez is a 47 year old female with the following diagnoses:   1. Type 2 diabetes mellitus without complication, without long-term current use of insulin (H)    2. Visual field defect         Patient was sent for consultation by Dr. Radha Gayle for blurry/double vision.     HPI: Patient follow with endocrinology for diabetes mellitus, fatigue, vit. D deficiency, goiter with asymmetric thyroid. She was last by Dr. Radha Gayle on 11/22/22 for diabetes.  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 She followed with Dr. 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 Last visit with her former neuro-opth Dr. Matthew was about 4 years ago; he retired.      Since her diagnosis of DM2, she has noticed intermittent double vision when tired, especially noticeable upon waking up in the mornings; this can last for a very brief time or up to two hours, occurring about 1-2 times weekly (perhaps more common when her blood sugars were not as well controlled). The doubled images are horizontal, but she is not sure whether it has been binocular or monocular.     She has noticed two episodes of floaters in the right eye in the last 1.5 months. Difficult reading on her computer monitor, which may be related more to illumination than the close distance as she does fine with her phone. No flashing light. No pain or irritation.     No history of eye surgery or trauma.      Independent historians:  Patient    Review of outside testing:        US thyroid (10/28/22):   Impression:  Mild size asymmetry between the right and left thyroid lobes, as above. Otherwise unremarkable thyroid ultrasound without nodules or suspicious findings.    MRI Head/Brain W & WO constrast (11/30/21):     IMPRESSION:   1. Normal appearance of the orbits and visual pathways. In particular, no evidence of active optic neuritis.   2.  Small FLAIR hyperintense lesion within the right posterior radha/superior cerebellar peduncle. This could represent a chronic demyelinating plaque given the history. No evidence of active or chronic demyelination elsewhere.   3. No other significant intracranial pathology.       My interpretation performed today of outside testing:  I have independently reviewed MRI Brain WWO performed 11/30/21. No abnormal FLAIR hyperintensity or enhancement in the orbits or elsewhere along the visual pathways, no apparent abnormal signal in the brainstem.      Review of outside clinical notes:    11/22/22 -- Visit with Dr. Radha Gayle    Past medical history:    Patient Active Problem List   Diagnosis     MS (multiple sclerosis) (H)     Obesity, Class I, BMI 30-34.9     Type 2 diabetes mellitus without complication, without long-term current use of insulin (H)     Morbid obesity (H)       Medications:   Adderall XR Cp24  blood glucose  fluticasone  hydrOXYzine  levonorgestrel  propranolol  Semaglutide (1 MG/DOSE) Sopn  vitamin D2      Family history / social history:  Patient denies known family history of severe vision loss. Aunts with thyroid disease.    Patient  reports that she has never smoked. She has never used smokeless tobacco. She reports current alcohol use. She reports that she does not use drugs.       Exam:  Uncorrected distance visual acuity was 20/20 -3 in the right eye and 20/20 -1 in the left eye. Intraocular pressure was 13 in the right eye and 15 in the left eye using ICare.  Color vision 11/11 right eye and 11/11 left eye.  Pupils isocoric. CN 2-12 intact. Anterior segment exam unremarkable.  Fundus exam with old gliosis versus congenital tuft inferior to ONH OS.  Orthophoric without nystagmus or internuclear ophthalmoplegia.    Tests ordered and interpreted today:    Glaucoma Top OU    Full OU    OCT Optic Nerve RNFL Spectralis OU (both eyes)    Possible mild RNFL edema 120/127  Lower concern for disc edema  based on ONH analysis  OCT macula with normal architecture OU  GCL 1.28 / 1.27        Discussion of management / interpretation with another provider:   None    Assessment/Plan:   It is my impression that patient has no afferent or efferent deficits. She is not currently on MS therapy and I see no evidence on exam or imaging of an optic neuritis.     There is no evidence of diabetic retinopathy today. Her A1c remains slightly elevated, but her home glucose monitoring has improved on Ozempic. We discussed ongoing BP/BG management to reduce risk of diabetic retinopathy in the future.    I am always happy to see Radha back for new concerns but I did not make a follow up appointment for her today.  We discussed return precautions and modes of contacting our service for changing symptoms. She may follow with a general ophthalmologist for her eye care going forward, which I would recommend on an annual basis in absence of worsening vision or other changes.            Attending Physician Attestation:  Complete documentation of historical and exam elements from today's encounter can be found in the full encounter summary report (not reduplicated in this progress note).  I personally obtained the chief complaint(s) and history of present illness.  I confirmed and edited as necessary the review of systems, past medical/surgical history, family history, social history, and examination findings as documented by others; and I examined the patient myself.  I personally reviewed the relevant tests, images, and reports as documented above.  I formulated and edited as necessary the assessment and plan and discussed the findings and management plan with the patient and family. I personally reviewed the ophthalmic test(s) associated with this encounter, agree with the interpretation(s) as documented by the resident/fellow, and have edited the corresponding report(s) as necessary.  - Salvador Merida MD        Precharting:  Ty  Martha, MS3   [employer]     Alejandro Romo MD PhD  Fellow, Neuro-Ophthalmology

## 2022-12-15 ENCOUNTER — OFFICE VISIT (OUTPATIENT)
Dept: OPHTHALMOLOGY | Facility: CLINIC | Age: 47
End: 2022-12-15
Attending: INTERNAL MEDICINE
Payer: COMMERCIAL

## 2022-12-15 DIAGNOSIS — H53.40 VISUAL FIELD DEFECT: Primary | ICD-10-CM

## 2022-12-15 DIAGNOSIS — E11.9 TYPE 2 DIABETES MELLITUS WITHOUT COMPLICATION, WITHOUT LONG-TERM CURRENT USE OF INSULIN (H): Primary | ICD-10-CM

## 2022-12-15 DIAGNOSIS — H53.40 VISUAL FIELD DEFECT: ICD-10-CM

## 2022-12-15 PROCEDURE — 92133 CPTRZD OPH DX IMG PST SGM ON: CPT | Performed by: OPHTHALMOLOGY

## 2022-12-15 PROCEDURE — 92004 COMPRE OPH EXAM NEW PT 1/>: CPT | Mod: GC | Performed by: OPHTHALMOLOGY

## 2022-12-15 PROCEDURE — 92083 EXTENDED VISUAL FIELD XM: CPT | Performed by: OPHTHALMOLOGY

## 2022-12-15 PROCEDURE — G0463 HOSPITAL OUTPT CLINIC VISIT: HCPCS | Mod: 25

## 2022-12-15 RX ORDER — MULTIVITAMIN WITH IRON
1 TABLET ORAL DAILY
COMMUNITY

## 2022-12-15 ASSESSMENT — CONF VISUAL FIELD
OS_NORMAL: 1
OD_INFERIOR_NASAL_RESTRICTION: 0
OS_INFERIOR_TEMPORAL_RESTRICTION: 0
OS_INFERIOR_NASAL_RESTRICTION: 0
OS_SUPERIOR_TEMPORAL_RESTRICTION: 0
OD_INFERIOR_TEMPORAL_RESTRICTION: 0
METHOD: COUNTING FINGERS
OD_NORMAL: 1
OS_SUPERIOR_NASAL_RESTRICTION: 0
OD_SUPERIOR_NASAL_RESTRICTION: 0
OD_SUPERIOR_TEMPORAL_RESTRICTION: 0

## 2022-12-15 ASSESSMENT — SLIT LAMP EXAM - LIDS
COMMENTS: NORMAL
COMMENTS: NORMAL

## 2022-12-15 ASSESSMENT — TONOMETRY
OS_IOP_MMHG: 15
IOP_METHOD: ICARE
OD_IOP_MMHG: 13

## 2022-12-15 ASSESSMENT — VISUAL ACUITY
OD_SC+: -3
METHOD: SNELLEN - LINEAR
OS_SC+: -1
OD_SC: 20/20
OS_SC: 20/20

## 2022-12-15 ASSESSMENT — CUP TO DISC RATIO
OD_RATIO: 0.3
OS_RATIO: 0.3

## 2022-12-15 NOTE — NURSING NOTE
Chief Complaint(s) and History of Present Illness(es)     New Patient    In both eyes.  Charactertized as  blurred vision.  Since onset it is stable.  Associated symptoms include double vision and floaters.  Negative for eye pain, headache and flashes.           Comments    Patient was sent for consultation by Dr. Gayle for MS and DM2.    Since her diagnosis of DM 2,  she has noticed intermittent double vision when tired, especially noticeable upon waking up in the mornings.  Difficult reading on her computer monitor.  She has noticed two episodes of floaters in the right eye in the last 1.5 months.  Being followed by Dr. Vicente at [facility] for her MS.  Was recently dx with DM2 this year and endocrinologist wanted her to have a neuro-ophth eval.  Last visit with her former neuro-opth was about 4 years ago, he retired.  Last BSL she checked was 125.     Lab Results       Component                Value               Date                       A1C                      7.7                 09/19/2022                 A1C                      7.4                 12/17/2019                HUGO Hubbard 12/15/2022 7:46 AM

## 2022-12-26 ENCOUNTER — OFFICE VISIT (OUTPATIENT)
Dept: FAMILY MEDICINE | Facility: CLINIC | Age: 47
End: 2022-12-26
Payer: COMMERCIAL

## 2022-12-26 VITALS
HEIGHT: 66 IN | OXYGEN SATURATION: 99 % | DIASTOLIC BLOOD PRESSURE: 86 MMHG | SYSTOLIC BLOOD PRESSURE: 138 MMHG | RESPIRATION RATE: 20 BRPM | HEART RATE: 95 BPM | TEMPERATURE: 97.9 F | WEIGHT: 207.5 LBS | BODY MASS INDEX: 33.35 KG/M2

## 2022-12-26 DIAGNOSIS — Z12.11 SCREEN FOR COLON CANCER: ICD-10-CM

## 2022-12-26 DIAGNOSIS — F41.9 ANXIETY: Primary | ICD-10-CM

## 2022-12-26 DIAGNOSIS — Z76.89 ENCOUNTER TO ESTABLISH CARE: ICD-10-CM

## 2022-12-26 PROCEDURE — 99213 OFFICE O/P EST LOW 20 MIN: CPT | Performed by: NURSE PRACTITIONER

## 2022-12-26 RX ORDER — METRONIDAZOLE 7.5 MG/G
GEL VAGINAL
COMMUNITY
Start: 2022-07-26 | End: 2022-12-26

## 2022-12-26 RX ORDER — ESCITALOPRAM OXALATE 10 MG/1
TABLET ORAL
Qty: 30 TABLET | Refills: 0 | Status: SHIPPED | OUTPATIENT
Start: 2022-12-26 | End: 2023-01-24

## 2022-12-26 RX ORDER — LANCETS
EACH MISCELLANEOUS
COMMUNITY
Start: 2022-12-18

## 2022-12-26 RX ORDER — HYDROXYZINE HYDROCHLORIDE 25 MG/1
25 TABLET, FILM COATED ORAL 3 TIMES DAILY PRN
Qty: 30 TABLET | Refills: 2 | Status: SHIPPED | OUTPATIENT
Start: 2022-12-26 | End: 2023-09-28

## 2022-12-26 RX ORDER — DEXTROAMPHETAMINE SACCHARATE, AMPHETAMINE ASPARTATE MONOHYDRATE, DEXTROAMPHETAMINE SULFATE AND AMPHETAMINE SULFATE 5; 5; 5; 5 MG/1; MG/1; MG/1; MG/1
20 CAPSULE, EXTENDED RELEASE ORAL DAILY
COMMUNITY
End: 2023-01-17

## 2022-12-26 ASSESSMENT — ANXIETY QUESTIONNAIRES
2. NOT BEING ABLE TO STOP OR CONTROL WORRYING: MORE THAN HALF THE DAYS
6. BECOMING EASILY ANNOYED OR IRRITABLE: MORE THAN HALF THE DAYS
GAD7 TOTAL SCORE: 11
7. FEELING AFRAID AS IF SOMETHING AWFUL MIGHT HAPPEN: SEVERAL DAYS
IF YOU CHECKED OFF ANY PROBLEMS ON THIS QUESTIONNAIRE, HOW DIFFICULT HAVE THESE PROBLEMS MADE IT FOR YOU TO DO YOUR WORK, TAKE CARE OF THINGS AT HOME, OR GET ALONG WITH OTHER PEOPLE: VERY DIFFICULT
7. FEELING AFRAID AS IF SOMETHING AWFUL MIGHT HAPPEN: SEVERAL DAYS
5. BEING SO RESTLESS THAT IT IS HARD TO SIT STILL: SEVERAL DAYS
4. TROUBLE RELAXING: MORE THAN HALF THE DAYS
GAD7 TOTAL SCORE: 11
8. IF YOU CHECKED OFF ANY PROBLEMS, HOW DIFFICULT HAVE THESE MADE IT FOR YOU TO DO YOUR WORK, TAKE CARE OF THINGS AT HOME, OR GET ALONG WITH OTHER PEOPLE?: VERY DIFFICULT
GAD7 TOTAL SCORE: 11
1. FEELING NERVOUS, ANXIOUS, OR ON EDGE: SEVERAL DAYS
3. WORRYING TOO MUCH ABOUT DIFFERENT THINGS: MORE THAN HALF THE DAYS

## 2022-12-26 ASSESSMENT — ENCOUNTER SYMPTOMS: NERVOUS/ANXIOUS: 1

## 2022-12-26 ASSESSMENT — PAIN SCALES - GENERAL: PAINLEVEL: NO PAIN (0)

## 2022-12-26 NOTE — PROGRESS NOTES
"  Assessment & Plan     Anxiety  Start lexapro. Continue hydroxyzine and propranolol prn. Follow up 1 month.  - escitalopram (LEXAPRO) 10 MG tablet; Take 0.5 tablets (5 mg) by mouth daily for 8 days, THEN 1 tablet (10 mg) daily for 22 days.  - hydrOXYzine (ATARAX) 25 MG tablet; Take 1 tablet (25 mg) by mouth 3 times daily as needed for anxiety    Screen for colon cancer  - Colonoscopy Screening  Referral; Future    Encounter to establish care           BMI:   Estimated body mass index is 34 kg/m  as calculated from the following:    Height as of this encounter: 1.664 m (5' 5.5\").    Weight as of this encounter: 94.1 kg (207 lb 8 oz).       See Patient Instructions    Return in about 4 weeks (around 1/23/2023).     The benefits, risks and potential side effects were discussed in detail. Black box warnings discussed as relevant. All patient questions were answered. The patient was instructed to follow up immediately if any adverse reactions develop.    Return precautions discussed, including when to seek urgent/emergent care.    Patient verbalizes understanding and agrees with plan of care. Patient stable for discharge.      [facility] FIELD [facility]   Radha is a 47 year old, presenting for the following health issues:  Diabetes and Anxiety      Anxiety    History of Present Illness       Mental Health Follow-up: Patient's depression since last visit has been:  Worse  The patient is not having other symptoms associated with depression.  Patient's anxiety since last visit has been:  Worse  The patient is having other symptoms associated with anxiety.    Patient is feeling anxious or having panic attacks.  Patient has no concerns about alcohol or drug use.    Diabetes:   She presents for follow up of diabetes.  She is checking home blood glucose with a continuous glucose monitor.  She checks blood glucose before and after meals.  Blood glucose is " sometimes over 200 and never under 70. She is aware of hypoglycemia symptoms including blurred vision. She has no concerns regarding her diabetes at this time.  She is not experiencing numbness or burning in feet, excessive thirst, blurry vision, weight changes or redness, sores or blisters on feet. The patient has not had a diabetic eye exam in the last 12 months.         She eats 2-3 servings of fruits and vegetables daily.She consumes 0 sweetened beverage(s) daily.She exercises with enough effort to increase her heart rate 9 or less minutes per day.  She exercises with enough effort to increase her heart rate 3 or less days per week.   She is taking medications regularly.    Today's PHQ-9         PHQ-9 Total Score: 17    PHQ-9 Q9 Thoughts of better off dead/self-harm past 2 weeks :   Not at all    How difficult have these problems made it for you to do your work, take care of things at home, or get along with other people: Very difficult         Dr. Vicente at [facility] for MS  Dr. Gayle for DM  Was seeing OBGYN at [place]   Here to establish with primary care    Anxiety:  First started in Dec 2019. Started after a breakup and she thought it would be short-tern however it has been occurring more frequently. It is currently present at least half of the week. She has been taking hydroxyzine for anxiety and propranolol for public speaking. She had not taking to take a daily medication however she feels like she is now at the point at which she needs it. She has a therapist whom she sees prn. Her sister is on Lexapro with good results. Radha's symptoms of anxiety include insomnia and whereas she was previously very positive, now it's hard to keep negative thoughts out of her head.    Takes Adderall for ADD/fatigue from MS which is currently prescribed by her neurologist. She reports she has been taking since about 2010.  She was diagnosed with MS in 2008.  She doesn't take the adderall all the time.   Without it, she  "has trouble staying on task        Review of Systems   Psychiatric/Behavioral: The patient is nervous/anxious.       Constitutional, HEENT, cardiovascular, pulmonary, gi and gu systems are negative, except as otherwise noted.      Objective    /86   Pulse 95   Temp 97.9  F (36.6  C) (Oral)   Resp 20   Ht 1.664 m (5' 5.5\")   Wt 94.1 kg (207 lb 8 oz)   SpO2 99%   BMI 34.00 kg/m    Body mass index is 34 kg/m .  Physical Exam   GENERAL: healthy, alert and no distress  EYES: Eyes grossly normal to inspection, PERRL and conjunctivae and sclerae normal  HENT: ear canals and TM's normal, nose and mouth without ulcers or lesions  NECK: no adenopathy, no asymmetry, masses, or scars and thyroid normal to palpation  RESP: lungs clear to auscultation - no rales, rhonchi or wheezes  CV: regular rate and rhythm, normal S1 S2, no S3 or S4, no murmur, click or rub, no peripheral edema and peripheral pulses strong  MS: no gross musculoskeletal defects noted, no edema  PSYCH: mentation appears normal, affect normal/bright                    "

## 2022-12-26 NOTE — PATIENT INSTRUCTIONS
Cetirizine (Zyrtec) 1 tab (10 mg) daily  Fluticasone (Flonase) 2 sprays (symptoms) or 1 spray (maintenance) 1-3 weeks

## 2022-12-27 ASSESSMENT — ANXIETY QUESTIONNAIRES: GAD7 TOTAL SCORE: 11

## 2023-01-17 ENCOUNTER — MYC MEDICAL ADVICE (OUTPATIENT)
Dept: FAMILY MEDICINE | Facility: CLINIC | Age: 48
End: 2023-01-17
Payer: COMMERCIAL

## 2023-01-17 DIAGNOSIS — F98.8 ATTENTION DEFICIT DISORDER, UNSPECIFIED HYPERACTIVITY PRESENCE: Primary | ICD-10-CM

## 2023-01-17 RX ORDER — DEXTROAMPHETAMINE SACCHARATE, AMPHETAMINE ASPARTATE MONOHYDRATE, DEXTROAMPHETAMINE SULFATE AND AMPHETAMINE SULFATE 5; 5; 5; 5 MG/1; MG/1; MG/1; MG/1
20 CAPSULE, EXTENDED RELEASE ORAL DAILY
Qty: 30 CAPSULE | Refills: 0 | Status: SHIPPED | OUTPATIENT
Start: 2023-01-17 | End: 2023-02-16

## 2023-01-17 NOTE — TELEPHONE ENCOUNTER
Previously prescribed by neurologist  We discussed about primary care provider taking over at last visit

## 2023-01-24 DIAGNOSIS — F41.9 ANXIETY: ICD-10-CM

## 2023-01-24 RX ORDER — ESCITALOPRAM OXALATE 10 MG/1
10 TABLET ORAL DAILY
Qty: 30 TABLET | Refills: 0 | Status: SHIPPED | OUTPATIENT
Start: 2023-01-24 | End: 2023-02-22

## 2023-03-01 ENCOUNTER — TELEPHONE (OUTPATIENT)
Dept: FAMILY MEDICINE | Facility: CLINIC | Age: 48
End: 2023-03-01
Payer: COMMERCIAL

## 2023-03-01 NOTE — LETTER
March 1, 2023    To  Radha Alvarez  [address]  [place] [address]    Your team at [facility] cares about your health. We have reviewed your chart and based on our findings; we are making the following recommendations to better manage your health.     You are in particular need of attention regarding the following:     Call or MyChart message your clinic to schedule a colonoscopy, schedule/ a FIT Test, or order a Cologuard test. If you are unsure what type of test you need, please call your clinic and speak to clinic staff.   Colon cancer is now the second leading cause of cancer-related deaths in the [place] for both men and women and there are over 130,000 new cases and 50,000 deaths per year from colon cancer. Colonoscopies can prevent 90-95% of these deaths. Problem lesions can be removed before they ever become cancer. This test is not only looking for cancer, but also getting rid of precancerous lesions.   If you are under/uninsured, we recommend you contact the [employer] Program.[employer] is a free colorectal cancer screening program that provides colonoscopies for eligible under/uninsured [place] men and women. If you are interested in receiving a free colonoscopy, please call [employer] at t [phone] (mention code [employer]) to see if you're eligible. Please have them send us the results.   PREVENTATIVE VISIT: Physical    If you have already completed these items, please contact the clinic via phone or   Svbtlehart so your care team can review and update your records. Thank you for   choosing [facility] for your healthcare needs. For any questions,   concerns, or to schedule an appointment please contact our clinic.    Healthy Regards,      Your [facility] Care Team

## 2023-03-01 NOTE — TELEPHONE ENCOUNTER
Patient Quality Outreach    Patient is due for the following:   Physical Preventive Adult Physical   Colonoscopy      Topic Date Due     Hepatitis B Vaccine (1 of 3 - 3-dose series) Never done     Pneumococcal Vaccine (1 - PCV) Never done     Flu Vaccine (1) Never done       Next Steps:   Schedule a Adult Preventative    Type of outreach:    Sent IBU[employer] message. and Sent letter.      Questions for provider review:    None     Jaymie Carney

## 2023-03-18 ENCOUNTER — LAB (OUTPATIENT)
Dept: LAB | Facility: CLINIC | Age: 48
End: 2023-03-18
Payer: COMMERCIAL

## 2023-03-18 DIAGNOSIS — E11.9 TYPE 2 DIABETES MELLITUS WITHOUT COMPLICATION, WITHOUT LONG-TERM CURRENT USE OF INSULIN (H): ICD-10-CM

## 2023-03-18 LAB — HBA1C MFR BLD: 6.4 % (ref 0–5.6)

## 2023-03-18 PROCEDURE — 80061 LIPID PANEL: CPT

## 2023-03-18 PROCEDURE — 83036 HEMOGLOBIN GLYCOSYLATED A1C: CPT

## 2023-03-18 PROCEDURE — 36415 COLL VENOUS BLD VENIPUNCTURE: CPT

## 2023-03-20 LAB
CHOLEST SERPL-MCNC: 187 MG/DL
FASTING STATUS PATIENT QL REPORTED: NO
HDLC SERPL-MCNC: 60 MG/DL
LDLC SERPL CALC-MCNC: 99 MG/DL
NONHDLC SERPL-MCNC: 127 MG/DL
TRIGL SERPL-MCNC: 141 MG/DL

## 2023-03-22 ENCOUNTER — TELEPHONE (OUTPATIENT)
Dept: ENDOCRINOLOGY | Facility: CLINIC | Age: 48
End: 2023-03-22
Payer: COMMERCIAL

## 2023-03-22 NOTE — TELEPHONE ENCOUNTER
ALBERTO for PT to call [phone] to reschedule the Dr. Gayle  appt.  I do see the PT is on a waitlist.  There is an open appt in April and May if PT wants to be seen sooner.

## 2023-03-27 ENCOUNTER — OFFICE VISIT (OUTPATIENT)
Dept: URGENT CARE | Facility: URGENT CARE | Age: 48
End: 2023-03-27
Payer: COMMERCIAL

## 2023-03-27 VITALS
SYSTOLIC BLOOD PRESSURE: 128 MMHG | DIASTOLIC BLOOD PRESSURE: 87 MMHG | HEART RATE: 79 BPM | WEIGHT: 205.2 LBS | OXYGEN SATURATION: 100 % | TEMPERATURE: 99.3 F | BODY MASS INDEX: 33.63 KG/M2

## 2023-03-27 DIAGNOSIS — J20.9 ACUTE BRONCHITIS, UNSPECIFIED ORGANISM: Primary | ICD-10-CM

## 2023-03-27 PROCEDURE — 99213 OFFICE O/P EST LOW 20 MIN: CPT | Performed by: FAMILY MEDICINE

## 2023-03-27 RX ORDER — AZITHROMYCIN 250 MG/1
TABLET, FILM COATED ORAL
Qty: 6 TABLET | Refills: 0 | Status: SHIPPED | OUTPATIENT
Start: 2023-03-27 | End: 2023-04-01

## 2023-03-27 RX ORDER — ALBUTEROL SULFATE 90 UG/1
2 AEROSOL, METERED RESPIRATORY (INHALATION) EVERY 4 HOURS PRN
Qty: 18 G | Refills: 0 | Status: SHIPPED | OUTPATIENT
Start: 2023-03-27

## 2023-03-27 NOTE — LETTER
[facility] [place] [facility] CARE [address] [address]  Phone: [phone]    March 27, 2023      RE:  Radha POWELL Stone  [address] [facility] [address]          To whom it may concern:    RE: Radha POWELL Stone    Patient was seen and treated today at our clinic and missed work.    Please excuse Radha from work until 3/30/2023 due to medical illness.  Thank you.      Sincerely,        Trent Merida MD

## 2023-03-30 NOTE — PROGRESS NOTES
SUBJECTIVE:  Radha Alvarez, a 47 year old female scheduled an appointment to discuss the following issues:  Acute bronchitis, unspecified organism    Medical, social, surgical, and family histories reviewed.     Cough (Pt has been sick since Feb 28, started out as sore throat, sinus infection, cough, lost voice, chest congestion, wheezing, cough so hard she has headache. Pt did a home Covid test 3 weeks ago - NEG)  Pt has had URI sxs since 2/28/23, lost her voice and feeling sore throat a few times.  Now she has chest congestion, coughing spells and wheezing.  No hx of asthma, smokes cigars at times.  Pt has MS in remission.  On Ozempic for diabetes.  Has stopped her Lexapro on her own.        ROS:  See HPI.  No nausea/vomiting.  Low grade fever.  No chest pain, mild SOB with cough.  No BM/urine problems.  No dizziness or syncope.      OBJECTIVE:  /87 (BP Location: Left arm, Patient Position: Sitting, Cuff Size: Adult Large)   Pulse 79   Temp 99.3  F (37.4  C) (Tympanic)   Wt 93.1 kg (205 lb 3.2 oz)   SpO2 100%   BMI 33.63 kg/m    EXAM:  GENERAL APPEARANCE: alert and mild distress, no cyanosis or accessory muscle use; moist mucus membrane, afebrile  EYES: Eyes grossly normal to inspection, PERRL and conjunctivae and sclerae normal  HENT: ear canals and TM's normal and nose and mouth without ulcers or lesions; no sinus tenderness; mildly inflamed throat, no exudate  NECK: no adenopathy, no asymmetry, masses, or scars and thyroid normal to palpation  RESP: scattered rhonchi and mild wheezes bilaterally; good air entry otherwise  CV: regular rates and rhythm, normal S1 S2, no S3 or S4 and no murmur, click or rub  LYMPHATICS: no cervical adenopathy  ABDOMEN: soft, nontender, without hepatosplenomegaly or masses and bowel sounds normal  MS: extremities normal- no gross deformities noted  SKIN: no suspicious lesions or rashes  NEURO: Normal strength and tone, mentation intact and speech  normal      ASSESSMENT/PLAN:  (J20.9) Acute bronchitis, unspecified organism  (primary encounter diagnosis)  Plan: azithromycin (ZITHROMAX) 250 MG tablet,         albuterol (PROAIR HFA/PROVENTIL HFA/VENTOLIN         HFA) 108 (90 Base) MCG/ACT inhaler  Care instructions given.  Pt to f/up PCP within 1 week if no improvement or worsening.  Warning signs and symptoms explained.

## 2023-04-01 ENCOUNTER — TRANSFERRED RECORDS (OUTPATIENT)
Dept: MULTI SPECIALTY CLINIC | Facility: CLINIC | Age: 48
End: 2023-04-01

## 2023-04-01 LAB — RETINOPATHY: NORMAL

## 2023-04-02 ENCOUNTER — HEALTH MAINTENANCE LETTER (OUTPATIENT)
Age: 48
End: 2023-04-02

## 2023-06-06 ENCOUNTER — MYC MEDICAL ADVICE (OUTPATIENT)
Dept: ENDOCRINOLOGY | Facility: CLINIC | Age: 48
End: 2023-06-06
Payer: COMMERCIAL

## 2023-06-06 DIAGNOSIS — E11.9 TYPE 2 DIABETES MELLITUS WITHOUT COMPLICATION, WITHOUT LONG-TERM CURRENT USE OF INSULIN (H): Primary | ICD-10-CM

## 2023-06-06 DIAGNOSIS — E55.9 VITAMIN D DEFICIENCY: ICD-10-CM

## 2023-06-06 ASSESSMENT — ENCOUNTER SYMPTOMS
EYE IRRITATION: 0
NERVOUS/ANXIOUS: 1
BLOOD IN STOOL: 0
DECREASED CONCENTRATION: 1
INSOMNIA: 1
CONSTIPATION: 0
EYE PAIN: 0
EYE WATERING: 1
BLOATING: 1
NAIL CHANGES: 0
HEARTBURN: 1
NAUSEA: 0
JAUNDICE: 0
DOUBLE VISION: 1
PANIC: 0
EYE REDNESS: 0
POOR WOUND HEALING: 0
DEPRESSION: 0
DIARRHEA: 1
BOWEL INCONTINENCE: 0
SKIN CHANGES: 0
RECTAL PAIN: 0
ABDOMINAL PAIN: 0
VOMITING: 0

## 2023-06-13 ENCOUNTER — TELEPHONE (OUTPATIENT)
Dept: ENDOCRINOLOGY | Facility: CLINIC | Age: 48
End: 2023-06-13

## 2023-06-13 ENCOUNTER — VIRTUAL VISIT (OUTPATIENT)
Dept: ENDOCRINOLOGY | Facility: CLINIC | Age: 48
End: 2023-06-13
Payer: COMMERCIAL

## 2023-06-13 DIAGNOSIS — E11.65 TYPE 2 DIABETES MELLITUS WITH HYPERGLYCEMIA, WITHOUT LONG-TERM CURRENT USE OF INSULIN (H): Primary | ICD-10-CM

## 2023-06-13 DIAGNOSIS — E04.9 GOITER: ICD-10-CM

## 2023-06-13 DIAGNOSIS — E11.9 TYPE 2 DIABETES MELLITUS WITHOUT COMPLICATION, WITHOUT LONG-TERM CURRENT USE OF INSULIN (H): ICD-10-CM

## 2023-06-13 PROCEDURE — 99213 OFFICE O/P EST LOW 20 MIN: CPT | Mod: VID | Performed by: INTERNAL MEDICINE

## 2023-06-13 RX ORDER — ACYCLOVIR 400 MG/1
TABLET ORAL
Qty: 3 EACH | Refills: 11 | Status: SHIPPED | OUTPATIENT
Start: 2023-06-13

## 2023-06-13 NOTE — TELEPHONE ENCOUNTER
PA Initiation    Medication: DEXCOM G7 SENSOR MISC  Insurance Company: [employer] ([facility]) - Phone [phone] Fax [phone]  Pharmacy Filling the Rx: [id number] - [place], [place] - [address] AT [facility] [address]  Filling Pharmacy Phone: [phone]  Filling Pharmacy Fax: [phone]  Start Date: 6/13/2023            Thank you,     Juan Parnell [facility]  Pharmacy Clinic [facility]  [email or web address]   Phone: [phone]  Fax: [phone]

## 2023-06-13 NOTE — PROGRESS NOTES
Video-Visit Details    Type of service:  Video Visit  Video Start Time: 3:18  Video End Time: 3:41  Originating Location (pt. Location): Home, [place]  Distant Location (provider location):  Home  Platform used for Video Visit: Zahraa Gayle MD    Radha Alvarez is a 47 year old year old female here for follow-up of DM2 via a billable video visit.  Also with PMHx of MS (Dx 2009).    Interval History:  - Ozempic still going well increased at last visit  - Minimal weight loss- slow steady, 3 weeks ago, started adding in cardio/activity in-- walking 4 days a week, minimum 3 miles each time. Which is helping with blood sugar ranges as well  - Checked bg recently and at 105  - Not doing well- diet-   - Done with Dexcom G6, was getting through work for free, does have irritation from the adhesion  - Considering G7, would want through insurance only,     1) Diabetes Mellitus    Diabetes History:  Diagnosis: 2 years ago with pre-diabetes, made lifestyle changes and got into prediabetes range. Noted blurry/double vision and BG was elevated.   Hospitalizations: none  Previous Regimens: Tried dexcom but was allergic to adhesive, metformin (nausea/bloating/diarrhea)  Current Regimen: Ozempic- 1.0 mg weekly,     BG check- rare  - Today 105      Complications: none    Last eye exam: Dec 2022- all good, neuroophthalmologist  Foot Exam: Completed Aug 2022, no neuropathy  ACEI/ARB: Not currently indicated but no recent DEE  Statin/ASA: Not currently indicated but no recent lipid panel    2) Fatigue, weight gain  HPI: Over previous 6-12 months, Currently experiecing blurry vision, extreme fatigue, headaches, minimal appetite, trouble focusing/brain fog.  Regular periods prior to mirena but with some breakthrough bleeding (prior to placement), no bleeding now Notes difficulty losing weight-- eating balanced diet/gym 3x weekly has always been able to lose weight and now feels that is long process to lose 5 lbs    INTERVAL  HISTORY- feels much better with improvement in blood sugar, starting of ozempic  Just starting to see some weight loss  Currently feels that something is off, which may be stress levels     3) Vit D Deficiency         BP Readings from Last 3 Encounters:   03/27/23 128/87   12/26/22 138/86   08/24/22 121/82       Lab Results   Component Value Date    A1C 7.4 12/17/2019       No results for input(s): CHOL, HDL, LDL, TRIG, CHOLHDLRATIO in the last 88657 hours.    No results found for: MICROL  No results found for: MICROALBUMIN      Wt Readings from Last 3 Encounters:   03/27/23 93.1 kg (205 lb 3.2 oz)   12/26/22 94.1 kg (207 lb 8 oz)   11/22/22 94.1 kg (207 lb 6.4 oz)       Current Outpatient Medications   Medication Sig Dispense Refill     levonorgestrel (MIRENA) 20 MCG/24HR IUD 1 Device by Intrauterine route       magnesium 250 MG tablet Take 1 tablet by mouth daily       Semaglutide, 1 MG/DOSE, 4 MG/3ML SOPN Inject 1 mg Subcutaneous once a week 9 mL 3     vitamin D2 (ERGOCALCIFEROL) 29189 units (1250 mcg) capsule Take 1 capsule (50,000 Units) by mouth once a week 8 capsule 1     albuterol (PROAIR HFA/PROVENTIL HFA/VENTOLIN HFA) 108 (90 Base) MCG/ACT inhaler Inhale 2 puffs into the lungs every 4 hours as needed for shortness of breath, wheezing or cough 18 g 0     blood glucose (NO BRAND SPECIFIED) lancets standard Use to test blood sugar 4 times daily or as directed. 200 each 4     blood glucose (NO BRAND SPECIFIED) test strip Use to test blood sugar 4 times daily or as directed. 200 strip 3     blood glucose monitoring (ACCU-CHEK FASTCLIX) lancets        escitalopram (LEXAPRO) 10 MG tablet Take 1 tablet (10 mg) by mouth daily (Patient not taking: Reported on 3/27/2023) 90 tablet 0     fluticasone (FLONASE) 50 MCG/ACT nasal spray Spray 1-2 sprays into both nostrils daily (Patient taking differently: Spray 1-2 sprays into both nostrils as needed) 1 Bottle 3     hydrOXYzine (ATARAX) 25 MG tablet Take 1 tablet (25 mg)  by mouth 3 times daily as needed for anxiety 30 tablet 2     propranolol (INDERAL) 20 MG tablet Take 1 tablet (20 mg) by mouth daily as needed (Anxiety) 30 tablet 0       Histories reviewed and updated in Epic.  Past Medical History:   Diagnosis Date     Diabetes (H)      Incompetences, cervix 2004, 2006    cerclage 2004     MS (multiple sclerosis) (H) 07/01/2008       Past Surgical History:   Procedure Laterality Date     TUBAL LIGATION  4/2006       Allergies:  Shellfish-derived products and Adhesive tape    Social History     Tobacco Use     Smoking status: Never     Smokeless tobacco: Never   Vaping Use     Vaping status: Never Used   Substance Use Topics     Alcohol use: Yes       Family History   Family history unknown: Yes          REVIEW OF SYSTEMS:   ROS: 10 point ROS neg other than the symptoms noted above in the HPI.      EXAM:  Vitals: There were no vitals taken for this visit.    BMIE= There is no height or weight on file to calculate BMI.  Physical Exam (visual exam)  VS:  no vital signs taken for video visit  CONSTITUTIONAL: healthy, alert and NAD, responding appropriately  ENT: normocephalic, no visual evidence of trauma, normal nose and oral mucosa  EYES: conjunctivae and sclerae normal, no exophthalmos or proptosis  THYROID:  no visualized nodules or goiter  LUNGS: no audible wheeze, cough or visible cyanosis, no visible retractions or increased work of breathing  EXTREMITIES: no hand tremors  NEUROLOGY: cranial nerves grossly intact with no obvious deficit.  SKIN:  no visualized skin lesions or rash, no edema visualized  PSYCH: mentation appears normal, normal judgement      ASSESSMENT/PLAN:  No diagnosis found.  No orders of the defined types were placed in this encounter.      1) Diabetes Mellitus at goal  - Glucose Control-last A1C March 6.4, will recheck now   - Increase Ozempic to 2mg   - Did not tolerate metformin   - Continue Dexcom upgraded to G7  - Nephrology- DEE wnl, recheck August  2023, ordered now  - Lipid- may consider statin at next visit, recheck FLP with next labs  - Recheck A1C  - Referred to DM education, order placed  - Referred to neurophthalmology-- apt Dec 2023    2)Fatigue  - improved, did not complete accurate dex suppression but will not repeat given improvement in symptoms at this time    3) Vit D deficiency  - 66311e weekly x8 weeks completed  Recheck level    3) Goiter, asymmetric thyroid  - Thyroid ultrasound repeat Oct 2023  - Repeat TFT annually (given fam history)    RTC- 5 months      A total of 26 minutes were spent today 06/13/23 on this visit including chart review, history and counseling, documentation and other activities as detailed above.     Answers for HPI/ROS submitted by the patient on 6/6/2023  General Symptoms: No  Skin Symptoms: Yes  HENT Symptoms: No  EYE SYMPTOMS: Yes  HEART SYMPTOMS: No  LUNG SYMPTOMS: No  INTESTINAL SYMPTOMS: Yes  URINARY SYMPTOMS: No  GYNECOLOGIC SYMPTOMS: No  BREAST SYMPTOMS: No  SKELETAL SYMPTOMS: No  BLOOD SYMPTOMS: No  NERVOUS SYSTEM SYMPTOMS: No  MENTAL HEALTH SYMPTOMS: Yes  Changes in hair: No  Changes in moles/birth marks: No  Itching: Yes  Rashes: Yes  Changes in nails: No  Acne: No  Hair in places you don't want it: No  Change in facial hair: No  Warts: Yes  Non-healing sores: No  Scarring: No  Flaking of skin: No  Color changes of hands/feet in cold : No  Sun sensitivity: No  Skin thickening: No  Eye pain: No  Vision loss: No  Dry eyes: Yes  Watery eyes: Yes  Eye bulging: No  Double vision: Yes  Flashing of lights: No  Spots: No  Floaters: No  Redness: No  Crossed eyes: No  Tunnel Vision: No  Yellowing of eyes: No  Eye irritation: No  Heart burn or indigestion: Yes  Nausea: No  Vomiting: No  Abdominal pain: No  Bloating: Yes  Constipation: No  Diarrhea: Yes  Blood in stool: No  Black stools: No  Rectal or Anal pain: No  Fecal incontinence: No  Yellowing of skin or eyes: No  Vomit with blood: No  Change in stools: No  Nervous  or Anxious: Yes  Depression: No  Trouble sleeping: Yes  Trouble thinking or concentrating: Yes  Mood changes: No  Panic attacks: No

## 2023-06-13 NOTE — LETTER
6/13/2023         RE: Radha Alvarez  [address]  [place] [address]        Dear Colleague,    Thank you for referring your patient, Radha Alvarez, to the [facility] [place]. Please see a copy of my visit note below.      Video-Visit Details    Type of service:  Video Visit  Video Start Time: 3:18  Video End Time: 3:41  Originating Location (pt. Location): [place], [place]  Distant Location (provider location):  Home  Platform used for Video Visit: Zahraa Gayle MD    Radha Alvarez is a 47 year old year old female here for follow-up of DM2 via a billable video visit.  Also with PMHx of MS (Dx 2009).    Interval History:  - Ozempic still going well increased at last visit  - Minimal weight loss- slow steady, 3 weeks ago, started adding in cardio/activity in-- walking 4 days a week, minimum 3 miles each time. Which is helping with blood sugar ranges as well  - Checked bg recently and at 105  - Not doing well- diet-   - Done with Dexcom G6, was getting through work for free, does have irritation from the adhesion  - Considering G7, would want through insurance only,     1) Diabetes Mellitus    Diabetes History:  Diagnosis: 2 years ago with pre-diabetes, made lifestyle changes and got into prediabetes range. Noted blurry/double vision and BG was elevated.   Hospitalizations: none  Previous Regimens: Tried dexcom but was allergic to adhesive, metformin (nausea/bloating/diarrhea)  Current Regimen: Ozempic- 1.0 mg weekly,     BG check- rare  - Today 105      Complications: none    Last eye exam: Dec 2022- all good, neuroophthalmologist  Foot Exam: Completed Aug 2022, no neuropathy  ACEI/ARB: Not currently indicated but no recent DEE  Statin/ASA: Not currently indicated but no recent lipid panel    2) Fatigue, weight gain  HPI: Over previous 6-12 months, Currently experiecing blurry vision, extreme fatigue, headaches, minimal appetite, trouble focusing/brain fog.  Regular  periods prior to mirena but with some breakthrough bleeding (prior to placement), no bleeding now Notes difficulty losing weight-- eating balanced diet/gym 3x weekly has always been able to lose weight and now feels that is long process to lose 5 lbs    INTERVAL HISTORY- feels much better with improvement in blood sugar, starting of ozempic  Just starting to see some weight loss  Currently feels that something is off, which may be stress levels     3) Vit D Deficiency         BP Readings from Last 3 Encounters:   03/27/23 128/87   12/26/22 138/86   08/24/22 121/82       Lab Results   Component Value Date    A1C 7.4 12/17/2019       No results for input(s): CHOL, HDL, LDL, TRIG, CHOLHDLRATIO in the last 14739 hours.    No results found for: MICROL  No results found for: MICROALBUMIN      Wt Readings from Last 3 Encounters:   03/27/23 93.1 kg (205 lb 3.2 oz)   12/26/22 94.1 kg (207 lb 8 oz)   11/22/22 94.1 kg (207 lb 6.4 oz)       Current Outpatient Medications   Medication Sig Dispense Refill     levonorgestrel (MIRENA) 20 MCG/24HR IUD 1 Device by Intrauterine route       magnesium 250 MG tablet Take 1 tablet by mouth daily       Semaglutide, 1 MG/DOSE, 4 MG/3ML SOPN Inject 1 mg Subcutaneous once a week 9 mL 3     vitamin D2 (ERGOCALCIFEROL) 60696 units (1250 mcg) capsule Take 1 capsule (50,000 Units) by mouth once a week 8 capsule 1     albuterol (PROAIR HFA/PROVENTIL HFA/VENTOLIN HFA) 108 (90 Base) MCG/ACT inhaler Inhale 2 puffs into the lungs every 4 hours as needed for shortness of breath, wheezing or cough 18 g 0     blood glucose (NO BRAND SPECIFIED) lancets standard Use to test blood sugar 4 times daily or as directed. 200 each 4     blood glucose (NO BRAND SPECIFIED) test strip Use to test blood sugar 4 times daily or as directed. 200 strip 3     blood glucose monitoring (ACCU-CHEK FASTCLIX) lancets        escitalopram (LEXAPRO) 10 MG tablet Take 1 tablet (10 mg) by mouth daily (Patient not taking: Reported  on 3/27/2023) 90 tablet 0     fluticasone (FLONASE) 50 MCG/ACT nasal spray Spray 1-2 sprays into both nostrils daily (Patient taking differently: Spray 1-2 sprays into both nostrils as needed) 1 Bottle 3     hydrOXYzine (ATARAX) 25 MG tablet Take 1 tablet (25 mg) by mouth 3 times daily as needed for anxiety 30 tablet 2     propranolol (INDERAL) 20 MG tablet Take 1 tablet (20 mg) by mouth daily as needed (Anxiety) 30 tablet 0       Histories reviewed and updated in Epic.  Past Medical History:   Diagnosis Date     Diabetes (H)      Incompetences, cervix 2004, 2006    cerclage 2004     MS (multiple sclerosis) (H) 07/01/2008       Past Surgical History:   Procedure Laterality Date     TUBAL LIGATION  4/2006       Allergies:  Shellfish-derived products and Adhesive tape    Social History     Tobacco Use     Smoking status: Never     Smokeless tobacco: Never   Vaping Use     Vaping status: Never Used   Substance Use Topics     Alcohol use: Yes       Family History   Family history unknown: Yes          REVIEW OF SYSTEMS:   ROS: 10 point ROS neg other than the symptoms noted above in the HPI.      EXAM:  Vitals: There were no vitals taken for this visit.    BMIE= There is no height or weight on file to calculate BMI.  Physical Exam (visual exam)  VS:  no vital signs taken for video visit  CONSTITUTIONAL: healthy, alert and NAD, responding appropriately  ENT: normocephalic, no visual evidence of trauma, normal nose and oral mucosa  EYES: conjunctivae and sclerae normal, no exophthalmos or proptosis  THYROID:  no visualized nodules or goiter  LUNGS: no audible wheeze, cough or visible cyanosis, no visible retractions or increased work of breathing  EXTREMITIES: no hand tremors  NEUROLOGY: cranial nerves grossly intact with no obvious deficit.  SKIN:  no visualized skin lesions or rash, no edema visualized  PSYCH: mentation appears normal, normal judgement      ASSESSMENT/PLAN:  No diagnosis found.  No orders of the defined  types were placed in this encounter.      1) Diabetes Mellitus at goal  - Glucose Control-last A1C March 6.4, will recheck now   - Increase Ozempic to 2mg   - Did not tolerate metformin   - Continue Dexcom upgraded to G7  - Nephrology- DEE wnl, recheck August 2023, ordered now  - Lipid- may consider statin at next visit, recheck FLP with next labs  - Recheck A1C  - Referred to DM education, order placed  - Referred to neurophthalmology-- apt Dec 2023    2)Fatigue  - improved, did not complete accurate dex suppression but will not repeat given improvement in symptoms at this time    3) Vit D deficiency  - 18032g weekly x8 weeks completed  Recheck level    3) Goiter, asymmetric thyroid  - Thyroid ultrasound repeat Oct 2023  - Repeat TFT annually (given fam history)    RTC- 5 months      A total of 26 minutes were spent today 06/13/23 on this visit including chart review, history and counseling, documentation and other activities as detailed above.     Answers for HPI/ROS submitted by the patient on 6/6/2023  General Symptoms: No  Skin Symptoms: Yes  HENT Symptoms: No  EYE SYMPTOMS: Yes  HEART SYMPTOMS: No  LUNG SYMPTOMS: No  INTESTINAL SYMPTOMS: Yes  URINARY SYMPTOMS: No  GYNECOLOGIC SYMPTOMS: No  BREAST SYMPTOMS: No  SKELETAL SYMPTOMS: No  BLOOD SYMPTOMS: No  NERVOUS SYSTEM SYMPTOMS: No  MENTAL HEALTH SYMPTOMS: Yes  Changes in hair: No  Changes in moles/birth marks: No  Itching: Yes  Rashes: Yes  Changes in nails: No  Acne: No  Hair in places you don't want it: No  Change in facial hair: No  Warts: Yes  Non-healing sores: No  Scarring: No  Flaking of skin: No  Color changes of hands/feet in cold : No  Sun sensitivity: No  Skin thickening: No  Eye pain: No  Vision loss: No  Dry eyes: Yes  Watery eyes: Yes  Eye bulging: No  Double vision: Yes  Flashing of lights: No  Spots: No  Floaters: No  Redness: No  Crossed eyes: No  Tunnel Vision: No  Yellowing of eyes: No  Eye irritation: No  Heart burn or indigestion:  Yes  Nausea: No  Vomiting: No  Abdominal pain: No  Bloating: Yes  Constipation: No  Diarrhea: Yes  Blood in stool: No  Black stools: No  Rectal or Anal pain: No  Fecal incontinence: No  Yellowing of skin or eyes: No  Vomit with blood: No  Change in stools: No  Nervous or Anxious: Yes  Depression: No  Trouble sleeping: Yes  Trouble thinking or concentrating: Yes  Mood changes: No  Panic attacks: No          Again, thank you for allowing me to participate in the care of your patient.        Sincerely,        Radha Gayle MD

## 2023-06-16 NOTE — TELEPHONE ENCOUNTER
PRIOR AUTHORIZATION DENIED    Medication: DEXCOM G7 SENSOR MISC  Insurance Company: [employer] ([facility]) - Phone [phone] Fax [phone]  Denial Date: 6/15/2023  Denial Rational:        Appeal Information:         Patient Notified:  No            Thank you,     Juan Parnell [facility]  Pharmacy Clinic [facility]  [email or web address]   Phone: [phone]  Fax: [phone]

## 2023-06-16 NOTE — TELEPHONE ENCOUNTER
**PA DENIED**    If provider would like to appeal denial outcome please let me know when appeal letter is complete and I will submit it to insurance.    Thank you,     Juan Parnell [facility]  Pharmacy Clinic [facility] [email or web address]   Phone: [phone]  Fax: [phone]

## 2023-06-24 ENCOUNTER — HEALTH MAINTENANCE LETTER (OUTPATIENT)
Age: 48
End: 2023-06-24

## 2023-07-13 ENCOUNTER — MYC MEDICAL ADVICE (OUTPATIENT)
Dept: FAMILY MEDICINE | Facility: CLINIC | Age: 48
End: 2023-07-13
Payer: COMMERCIAL

## 2023-07-13 ENCOUNTER — MYC REFILL (OUTPATIENT)
Dept: FAMILY MEDICINE | Facility: CLINIC | Age: 48
End: 2023-07-13
Payer: COMMERCIAL

## 2023-07-13 DIAGNOSIS — F41.9 ANXIETY: ICD-10-CM

## 2023-07-13 DIAGNOSIS — F98.8 ATTENTION DEFICIT DISORDER, UNSPECIFIED HYPERACTIVITY PRESENCE: Primary | ICD-10-CM

## 2023-07-13 RX ORDER — DEXTROAMPHETAMINE SACCHARATE, AMPHETAMINE ASPARTATE MONOHYDRATE, DEXTROAMPHETAMINE SULFATE AND AMPHETAMINE SULFATE 5; 5; 5; 5 MG/1; MG/1; MG/1; MG/1
20 CAPSULE, EXTENDED RELEASE ORAL DAILY
Qty: 30 CAPSULE | Refills: 0 | Status: SHIPPED | OUTPATIENT
Start: 2023-07-13 | End: 2023-09-28

## 2023-07-13 RX ORDER — DEXTROAMPHETAMINE SACCHARATE, AMPHETAMINE ASPARTATE MONOHYDRATE, DEXTROAMPHETAMINE SULFATE AND AMPHETAMINE SULFATE 5; 5; 5; 5 MG/1; MG/1; MG/1; MG/1
20 CAPSULE, EXTENDED RELEASE ORAL DAILY
COMMUNITY
End: 2023-07-13

## 2023-07-14 DIAGNOSIS — F41.9 ANXIETY: ICD-10-CM

## 2023-07-14 RX ORDER — PROPRANOLOL HYDROCHLORIDE 20 MG/1
TABLET ORAL
Qty: 90 TABLET | OUTPATIENT
Start: 2023-07-14

## 2023-07-14 RX ORDER — PROPRANOLOL HYDROCHLORIDE 20 MG/1
20 TABLET ORAL DAILY PRN
Qty: 30 TABLET | Refills: 0 | Status: SHIPPED | OUTPATIENT
Start: 2023-07-14 | End: 2023-08-29

## 2023-07-27 ENCOUNTER — TELEPHONE (OUTPATIENT)
Dept: FAMILY MEDICINE | Facility: CLINIC | Age: 48
End: 2023-07-27
Payer: COMMERCIAL

## 2023-07-27 NOTE — TELEPHONE ENCOUNTER
Patient Quality Outreach    Patient is due for the following:   Diabetes -  A1C  Colon Cancer Screening  Breast Cancer Screening - Mammogram  Physical Preventive Adult Physical    Next Steps:   Schedule a Adult Preventative    Type of outreach:    Sent [employer] message.      Questions for provider review:    None           Arabella Alaniz RN

## 2023-08-10 DIAGNOSIS — E11.65 TYPE 2 DIABETES MELLITUS WITH HYPERGLYCEMIA, WITHOUT LONG-TERM CURRENT USE OF INSULIN (H): Primary | ICD-10-CM

## 2023-08-11 ENCOUNTER — LAB (OUTPATIENT)
Dept: LAB | Facility: CLINIC | Age: 48
End: 2023-08-11
Payer: COMMERCIAL

## 2023-08-11 ENCOUNTER — OFFICE VISIT (OUTPATIENT)
Dept: URGENT CARE | Facility: URGENT CARE | Age: 48
End: 2023-08-11
Payer: COMMERCIAL

## 2023-08-11 VITALS
SYSTOLIC BLOOD PRESSURE: 135 MMHG | OXYGEN SATURATION: 100 % | DIASTOLIC BLOOD PRESSURE: 88 MMHG | TEMPERATURE: 98.3 F | RESPIRATION RATE: 16 BRPM | BODY MASS INDEX: 31.96 KG/M2 | HEART RATE: 81 BPM | WEIGHT: 195 LBS

## 2023-08-11 DIAGNOSIS — E11.65 TYPE 2 DIABETES MELLITUS WITH HYPERGLYCEMIA, WITHOUT LONG-TERM CURRENT USE OF INSULIN (H): ICD-10-CM

## 2023-08-11 DIAGNOSIS — E11.9 TYPE 2 DIABETES MELLITUS WITHOUT COMPLICATION, WITHOUT LONG-TERM CURRENT USE OF INSULIN (H): ICD-10-CM

## 2023-08-11 DIAGNOSIS — E04.9 GOITER: ICD-10-CM

## 2023-08-11 DIAGNOSIS — R10.9 FLANK PAIN: Primary | ICD-10-CM

## 2023-08-11 LAB
ALBUMIN UR-MCNC: ABNORMAL MG/DL
ANION GAP SERPL CALCULATED.3IONS-SCNC: 13 MMOL/L (ref 7–15)
APPEARANCE UR: CLEAR
BACTERIA #/AREA URNS HPF: ABNORMAL /HPF
BILIRUB UR QL STRIP: NEGATIVE
BUN SERPL-MCNC: 11.6 MG/DL (ref 6–20)
CALCIUM SERPL-MCNC: 9.6 MG/DL (ref 8.6–10)
CHLORIDE SERPL-SCNC: 105 MMOL/L (ref 98–107)
COLOR UR AUTO: YELLOW
CREAT SERPL-MCNC: 0.93 MG/DL (ref 0.51–0.95)
CREAT UR-MCNC: 385 MG/DL
DEPRECATED HCO3 PLAS-SCNC: 24 MMOL/L (ref 22–29)
GFR SERPL CREATININE-BSD FRML MDRD: 76 ML/MIN/1.73M2
GLUCOSE SERPL-MCNC: 92 MG/DL (ref 70–99)
GLUCOSE UR STRIP-MCNC: NEGATIVE MG/DL
HBA1C MFR BLD: 6 % (ref 0–5.6)
HGB UR QL STRIP: ABNORMAL
KETONES UR STRIP-MCNC: NEGATIVE MG/DL
LEUKOCYTE ESTERASE UR QL STRIP: NEGATIVE
MICROALBUMIN UR-MCNC: 24.1 MG/L
MICROALBUMIN/CREAT UR: 6.26 MG/G CR (ref 0–25)
MUCOUS THREADS #/AREA URNS LPF: PRESENT /LPF
NITRATE UR QL: NEGATIVE
PH UR STRIP: 6 [PH] (ref 5–7)
POTASSIUM SERPL-SCNC: 3.5 MMOL/L (ref 3.4–5.3)
RBC #/AREA URNS AUTO: ABNORMAL /HPF
SODIUM SERPL-SCNC: 142 MMOL/L (ref 136–145)
SP GR UR STRIP: >=1.03 (ref 1–1.03)
SQUAMOUS #/AREA URNS AUTO: ABNORMAL /LPF
TSH SERPL DL<=0.005 MIU/L-ACNC: 0.79 UIU/ML (ref 0.3–4.2)
UROBILINOGEN UR STRIP-ACNC: 1 E.U./DL
WBC #/AREA URNS AUTO: ABNORMAL /HPF

## 2023-08-11 PROCEDURE — 84443 ASSAY THYROID STIM HORMONE: CPT

## 2023-08-11 PROCEDURE — 36415 COLL VENOUS BLD VENIPUNCTURE: CPT

## 2023-08-11 PROCEDURE — 83036 HEMOGLOBIN GLYCOSYLATED A1C: CPT

## 2023-08-11 PROCEDURE — 80048 BASIC METABOLIC PNL TOTAL CA: CPT

## 2023-08-11 PROCEDURE — 82570 ASSAY OF URINE CREATININE: CPT

## 2023-08-11 PROCEDURE — 82306 VITAMIN D 25 HYDROXY: CPT

## 2023-08-11 PROCEDURE — 81001 URINALYSIS AUTO W/SCOPE: CPT

## 2023-08-11 PROCEDURE — 99213 OFFICE O/P EST LOW 20 MIN: CPT | Performed by: PHYSICIAN ASSISTANT

## 2023-08-11 PROCEDURE — 82043 UR ALBUMIN QUANTITATIVE: CPT

## 2023-08-11 ASSESSMENT — ENCOUNTER SYMPTOMS
SORE THROAT: 0
SHORTNESS OF BREATH: 0
RHINORRHEA: 0
JOINT SWELLING: 0
HEMATURIA: 0
CARDIOVASCULAR NEGATIVE: 1
DIZZINESS: 0
CONSTITUTIONAL NEGATIVE: 1
CHILLS: 0
NAUSEA: 0
GASTROINTESTINAL NEGATIVE: 1
DYSURIA: 0
MYALGIAS: 0
POLYDIPSIA: 0
RESPIRATORY NEGATIVE: 1
VOMITING: 0
ABDOMINAL PAIN: 0
ARTHRALGIAS: 0
FREQUENCY: 0
HEADACHES: 0
WOUND: 0
FLANK PAIN: 1
PALPITATIONS: 0
EYES NEGATIVE: 1
ALLERGIC/IMMUNOLOGIC NEGATIVE: 1
WEAKNESS: 0
COUGH: 0
NEUROLOGICAL NEGATIVE: 1
ENDOCRINE NEGATIVE: 1
LIGHT-HEADEDNESS: 0
DIARRHEA: 0
NECK STIFFNESS: 0
BACK PAIN: 0
ADENOPATHY: 0
NECK PAIN: 0
ACTIVITY CHANGE: 0
FATIGUE: 0
FEVER: 0

## 2023-08-14 LAB — DEPRECATED CALCIDIOL+CALCIFEROL SERPL-MC: 24 UG/L (ref 20–75)

## 2023-08-24 ENCOUNTER — LAB (OUTPATIENT)
Dept: LAB | Facility: CLINIC | Age: 48
End: 2023-08-24
Payer: COMMERCIAL

## 2023-08-24 ENCOUNTER — ANCILLARY PROCEDURE (OUTPATIENT)
Dept: MAMMOGRAPHY | Facility: CLINIC | Age: 48
End: 2023-08-24
Attending: NURSE PRACTITIONER
Payer: COMMERCIAL

## 2023-08-24 DIAGNOSIS — Z12.31 VISIT FOR SCREENING MAMMOGRAM: ICD-10-CM

## 2023-08-24 DIAGNOSIS — E55.9 VITAMIN D DEFICIENCY: ICD-10-CM

## 2023-08-24 DIAGNOSIS — E11.9 TYPE 2 DIABETES MELLITUS WITHOUT COMPLICATION, WITHOUT LONG-TERM CURRENT USE OF INSULIN (H): ICD-10-CM

## 2023-08-24 LAB
CHOLEST SERPL-MCNC: 181 MG/DL
HDLC SERPL-MCNC: 51 MG/DL
LDLC SERPL CALC-MCNC: 117 MG/DL
NONHDLC SERPL-MCNC: 130 MG/DL
TRIGL SERPL-MCNC: 66 MG/DL

## 2023-08-24 PROCEDURE — 77067 SCR MAMMO BI INCL CAD: CPT | Mod: TC | Performed by: STUDENT IN AN ORGANIZED HEALTH CARE EDUCATION/TRAINING PROGRAM

## 2023-08-24 PROCEDURE — 80061 LIPID PANEL: CPT

## 2023-08-24 PROCEDURE — 77063 BREAST TOMOSYNTHESIS BI: CPT | Mod: TC | Performed by: STUDENT IN AN ORGANIZED HEALTH CARE EDUCATION/TRAINING PROGRAM

## 2023-08-24 PROCEDURE — 36415 COLL VENOUS BLD VENIPUNCTURE: CPT

## 2023-08-29 ENCOUNTER — MYC REFILL (OUTPATIENT)
Dept: FAMILY MEDICINE | Facility: CLINIC | Age: 48
End: 2023-08-29
Payer: COMMERCIAL

## 2023-08-29 DIAGNOSIS — F41.9 ANXIETY: ICD-10-CM

## 2023-08-29 DIAGNOSIS — F98.8 ATTENTION DEFICIT DISORDER, UNSPECIFIED HYPERACTIVITY PRESENCE: ICD-10-CM

## 2023-08-29 RX ORDER — PROPRANOLOL HYDROCHLORIDE 20 MG/1
20 TABLET ORAL DAILY PRN
Qty: 90 TABLET | Refills: 1 | Status: SHIPPED | OUTPATIENT
Start: 2023-08-29 | End: 2023-09-28

## 2023-08-29 RX ORDER — DEXTROAMPHETAMINE SACCHARATE, AMPHETAMINE ASPARTATE MONOHYDRATE, DEXTROAMPHETAMINE SULFATE AND AMPHETAMINE SULFATE 5; 5; 5; 5 MG/1; MG/1; MG/1; MG/1
20 CAPSULE, EXTENDED RELEASE ORAL DAILY
Qty: 30 CAPSULE | Refills: 0 | OUTPATIENT
Start: 2023-08-29

## 2023-08-30 DIAGNOSIS — F98.8 ATTENTION DEFICIT DISORDER, UNSPECIFIED HYPERACTIVITY PRESENCE: ICD-10-CM

## 2023-08-30 RX ORDER — DEXTROAMPHETAMINE SACCHARATE, AMPHETAMINE ASPARTATE MONOHYDRATE, DEXTROAMPHETAMINE SULFATE AND AMPHETAMINE SULFATE 5; 5; 5; 5 MG/1; MG/1; MG/1; MG/1
20 CAPSULE, EXTENDED RELEASE ORAL DAILY
Qty: 30 CAPSULE | Refills: 0 | OUTPATIENT
Start: 2023-08-30

## 2023-08-30 NOTE — TELEPHONE ENCOUNTER
Medication Question or Refill    Contacts         Type Contact Phone/Fax    08/30/2023 02:46 PM CDT Phone (Incoming) Radha Alvarez (Self) [phone] (W)            What medication are you calling about (include dose and sig)?: amphetamine-dextroamphetamine (ADDERALL XR) 20 MG 24 hr capsule     Preferred Pharmacy:    [id number] - [place], [place] - [address] [facility] [address]  [facility] [address]  Phone: [phone] Fax: [phone]          Controlled Substance Agreement on file:   CSA -- Patient Level:    CSA: None found at the patient level.       Who prescribed the medication?: Radha Hall    Do you need a refill? Yes    Patient offered an appointment? No Patient has an upcoming establish care appointment with Radha Hall    Do you have any questions or concerns?  Yes: Patient needs this 1 time refill to get her through until her appointment. Patient needs this by tomorrow as her insurance stops after tomorrow.      Could we send this information to you in [email or web address] or would you prefer to receive a phone call?:   Patient would prefer a phone call   Okay to leave a detailed message?: Yes  Cell number on file:    Telephone Information:   Mobile [phone]     Juliana Turner MA  [facility]   Primary Care

## 2023-09-28 ENCOUNTER — OFFICE VISIT (OUTPATIENT)
Dept: FAMILY MEDICINE | Facility: CLINIC | Age: 48
End: 2023-09-28

## 2023-09-28 VITALS
TEMPERATURE: 97.5 F | DIASTOLIC BLOOD PRESSURE: 84 MMHG | HEIGHT: 66 IN | WEIGHT: 194.6 LBS | OXYGEN SATURATION: 97 % | HEART RATE: 89 BPM | BODY MASS INDEX: 31.27 KG/M2 | SYSTOLIC BLOOD PRESSURE: 124 MMHG

## 2023-09-28 DIAGNOSIS — E11.9 TYPE 2 DIABETES MELLITUS WITHOUT COMPLICATION, WITHOUT LONG-TERM CURRENT USE OF INSULIN (H): Primary | ICD-10-CM

## 2023-09-28 DIAGNOSIS — Z12.4 CERVICAL CANCER SCREENING: ICD-10-CM

## 2023-09-28 DIAGNOSIS — G35 MS (MULTIPLE SCLEROSIS) (H): ICD-10-CM

## 2023-09-28 DIAGNOSIS — F41.9 ANXIETY: ICD-10-CM

## 2023-09-28 DIAGNOSIS — F98.8 ATTENTION DEFICIT DISORDER, UNSPECIFIED HYPERACTIVITY PRESENCE: ICD-10-CM

## 2023-09-28 DIAGNOSIS — Z12.11 SCREEN FOR COLON CANCER: ICD-10-CM

## 2023-09-28 DIAGNOSIS — J35.8 TONSILLOLITH: ICD-10-CM

## 2023-09-28 DIAGNOSIS — E55.9 VITAMIN D DEFICIENCY: ICD-10-CM

## 2023-09-28 PROCEDURE — 99214 OFFICE O/P EST MOD 30 MIN: CPT | Performed by: FAMILY MEDICINE

## 2023-09-28 RX ORDER — DEXTROAMPHETAMINE SACCHARATE, AMPHETAMINE ASPARTATE MONOHYDRATE, DEXTROAMPHETAMINE SULFATE AND AMPHETAMINE SULFATE 5; 5; 5; 5 MG/1; MG/1; MG/1; MG/1
20 CAPSULE, EXTENDED RELEASE ORAL DAILY
Qty: 30 CAPSULE | Refills: 0 | Status: SHIPPED | OUTPATIENT
Start: 2023-09-28 | End: 2023-11-08

## 2023-09-28 RX ORDER — PROPRANOLOL HYDROCHLORIDE 20 MG/1
20 TABLET ORAL DAILY PRN
Qty: 90 TABLET | Refills: 1 | Status: SHIPPED | OUTPATIENT
Start: 2023-09-28 | End: 2024-05-21

## 2023-09-28 RX ORDER — HYDROXYZINE HYDROCHLORIDE 25 MG/1
25 TABLET, FILM COATED ORAL 3 TIMES DAILY PRN
Qty: 30 TABLET | Refills: 2 | Status: SHIPPED | OUTPATIENT
Start: 2023-09-28 | End: 2024-01-08

## 2023-09-28 ASSESSMENT — PAIN SCALES - GENERAL: PAINLEVEL: NO PAIN (0)

## 2023-09-28 NOTE — PROGRESS NOTES
"  Assessment & Plan     Radha presented to the clinic to establish care since her PCP moved.  Type 2 diabetes mellitus without complication, without long-term current use of insulin (H)  -Following with endocrinology.  -History A1c of 6 -1 month ago.    Tonsillolith  -Intermittent flareups.  Last flare up was painful with bleeding.  -Preferred ENT referral to consider and discuss options of tonsillectomy.    - Adult ENT  Referral; Future    Vitamin D deficiency  -Following with endocrinology.  On 50,000 vitamin D supplements weekly.  -Low normal vitamin D level a month ago.    MS (multiple sclerosis) (H)  -Stable.    Screen for colon cancer    - Colonoscopy Screening  Referral; Future    Anxiety  -Stable.  On propanolol for public speaking.  - hydrOXYzine (ATARAX) 25 MG tablet; Take 1 tablet (25 mg) by mouth 3 times daily as needed for anxiety  - propranolol (INDERAL) 20 MG tablet; Take 1 tablet (20 mg) by mouth daily as needed (Anxiety)    Attention deficit disorder, unspecified hyperactivity presence  -[place] .    - amphetamine-dextroamphetamine (ADDERALL XR) 20 MG 24 hr capsule; Take 1 capsule (20 mg) by mouth daily    Cervical cancer screening  -Radha preferred to schedule separate visit for Pap testing.  -Her IUD needs to be switched as well (been almost 7 yrs).  She will do both at the same time.         MED REC REQUIRED  Post Medication Reconciliation Status:  Discharge medications reconciled, continue medications without change  BMI:   Estimated body mass index is 31.84 kg/m  as calculated from the following:    Height as of this encounter: 1.665 m (5' 5.55\").    Weight as of this encounter: 88.3 kg (194 lb 9.6 oz).           Kimberly Contreras MD  [facility]    Ariadne Garcia is a 47 year old, presenting for the following health issues:  No chief complaint on file.      9/28/2023     9:20 AM   Additional Questions   Roomed by " [facility]       Diabetes Follow-up    How often are you checking your blood sugar? Not at all  What concerns do you have today about your diabetes? None   Do you have any of these symptoms? (Select all that apply)  No numbness or tingling in feet.  No redness, sores or blisters on feet.  No complaints of excessive thirst.  No reports of blurry vision.  No significant changes to weight.        Bilateral tubectomy _(tied) , IUD for menstrual issues  BP Readings from Last 2 Encounters:   08/11/23 135/88   03/27/23 128/87     Hemoglobin A1C (%)   Date Value   08/11/2023 6.0 (H)   03/18/2023 6.4 (H)   12/17/2019 7.4 (H)     LDL Cholesterol Calculated (mg/dL)   Date Value   08/24/2023 117 (H)   03/18/2023 99         How many servings of fruits and vegetables do you eat daily?  0-1  On average, how many sweetened beverages do you drink each day (Examples: soda, juice, sweet tea, etc.  Do NOT count diet or artificially sweetened beverages)?   0  How many days per week do you exercise enough to make your heart beat faster? 5  How many minutes a day do you exercise enough to make your heart beat faster? 20 - 29  How many days per week do you miss taking your medication? 0        Review of Systems   Constitutional, HEENT, cardiovascular, pulmonary, gi and gu systems are negative, except as otherwise noted.      Objective    There were no vitals taken for this visit.  There is no height or weight on file to calculate BMI.  Physical Exam   GENERAL: healthy, alert and no distress  NECK: no adenopathy, no asymmetry, masses, or scars and thyroid normal to palpation  RESP: lungs clear to auscultation - no rales, rhonchi or wheezes  CV: regular rate and rhythm, normal S1 S2, no S3 or S4, no murmur, click or rub, no peripheral edema and peripheral pulses strong  ABDOMEN: soft, nontender, no hepatosplenomegaly, no masses and bowel sounds normal  MS: no gross musculoskeletal defects noted, no edema

## 2023-11-08 ENCOUNTER — MYC MEDICAL ADVICE (OUTPATIENT)
Dept: ENDOCRINOLOGY | Facility: CLINIC | Age: 48
End: 2023-11-08

## 2023-11-08 ENCOUNTER — MYC REFILL (OUTPATIENT)
Dept: FAMILY MEDICINE | Facility: CLINIC | Age: 48
End: 2023-11-08

## 2023-11-08 DIAGNOSIS — E11.9 TYPE 2 DIABETES MELLITUS WITHOUT COMPLICATION, WITHOUT LONG-TERM CURRENT USE OF INSULIN (H): ICD-10-CM

## 2023-11-08 DIAGNOSIS — F98.8 ATTENTION DEFICIT DISORDER, UNSPECIFIED HYPERACTIVITY PRESENCE: ICD-10-CM

## 2023-11-08 DIAGNOSIS — E11.65 TYPE 2 DIABETES MELLITUS WITH HYPERGLYCEMIA, WITHOUT LONG-TERM CURRENT USE OF INSULIN (H): ICD-10-CM

## 2023-11-09 RX ORDER — DEXTROAMPHETAMINE SACCHARATE, AMPHETAMINE ASPARTATE MONOHYDRATE, DEXTROAMPHETAMINE SULFATE AND AMPHETAMINE SULFATE 5; 5; 5; 5 MG/1; MG/1; MG/1; MG/1
20 CAPSULE, EXTENDED RELEASE ORAL DAILY
Qty: 30 CAPSULE | Refills: 0 | Status: SHIPPED | OUTPATIENT
Start: 2023-11-09 | End: 2023-12-21

## 2023-11-27 ENCOUNTER — MYC MEDICAL ADVICE (OUTPATIENT)
Dept: ENDOCRINOLOGY | Facility: CLINIC | Age: 48
End: 2023-11-27

## 2023-11-27 DIAGNOSIS — E11.65 TYPE 2 DIABETES MELLITUS WITH HYPERGLYCEMIA, WITHOUT LONG-TERM CURRENT USE OF INSULIN (H): ICD-10-CM

## 2023-11-27 DIAGNOSIS — E11.9 TYPE 2 DIABETES MELLITUS WITHOUT COMPLICATION, WITHOUT LONG-TERM CURRENT USE OF INSULIN (H): ICD-10-CM

## 2023-12-21 ENCOUNTER — MYC MEDICAL ADVICE (OUTPATIENT)
Dept: FAMILY MEDICINE | Facility: CLINIC | Age: 48
End: 2023-12-21

## 2023-12-21 DIAGNOSIS — F98.8 ATTENTION DEFICIT DISORDER, UNSPECIFIED HYPERACTIVITY PRESENCE: ICD-10-CM

## 2023-12-21 RX ORDER — DEXTROAMPHETAMINE SACCHARATE, AMPHETAMINE ASPARTATE MONOHYDRATE, DEXTROAMPHETAMINE SULFATE AND AMPHETAMINE SULFATE 5; 5; 5; 5 MG/1; MG/1; MG/1; MG/1
20 CAPSULE, EXTENDED RELEASE ORAL DAILY
Qty: 30 CAPSULE | Refills: 0 | Status: SHIPPED | OUTPATIENT
Start: 2023-12-21 | End: 2024-01-29 | Stop reason: DRUGHIGH

## 2023-12-28 ENCOUNTER — MYC MEDICAL ADVICE (OUTPATIENT)
Dept: ENDOCRINOLOGY | Facility: CLINIC | Age: 48
End: 2023-12-28

## 2023-12-28 DIAGNOSIS — E11.9 TYPE 2 DIABETES MELLITUS WITHOUT COMPLICATION, WITHOUT LONG-TERM CURRENT USE OF INSULIN (H): Primary | ICD-10-CM

## 2024-01-02 ENCOUNTER — TRANSFERRED RECORDS (OUTPATIENT)
Dept: MULTI SPECIALTY CLINIC | Facility: CLINIC | Age: 49
End: 2024-01-02

## 2024-01-02 LAB — RETINOPATHY: NORMAL

## 2024-01-08 DIAGNOSIS — F41.9 ANXIETY: ICD-10-CM

## 2024-01-08 RX ORDER — HYDROXYZINE HYDROCHLORIDE 25 MG/1
25 TABLET, FILM COATED ORAL 3 TIMES DAILY PRN
Qty: 30 TABLET | Refills: 0 | Status: SHIPPED | OUTPATIENT
Start: 2024-01-08

## 2024-01-11 ENCOUNTER — TELEPHONE (OUTPATIENT)
Dept: ENDOCRINOLOGY | Facility: CLINIC | Age: 49
End: 2024-01-11

## 2024-01-11 NOTE — TELEPHONE ENCOUNTER
Prior Authorization Specialty Medication Request    Medication/Dose: Semaglutide, 2 MG/DOSE, (OZEMPIC) 8 MG/3ML pen  Diagnosis and ICD code (if different than what is on RX):  E11.65       Insurance   Primary: N/A  Insurance ID:  N/A

## 2024-01-18 NOTE — TELEPHONE ENCOUNTER
Central Prior Authorization Team - Phone: [phone]     PA Initiation    Medication: OZEMPIC (2 MG/DOSE) 8 MG/3ML SC SOPN  Insurance Company: [employer] [place] - Phone [phone] Fax [phone]  Pharmacy Filling the Rx: [id number] - [place], [place] - [address] AT [facility] [address]  Filling Pharmacy Phone: [phone]  Filling Pharmacy Fax:    Start Date: 1/18/2024

## 2024-01-20 ENCOUNTER — HEALTH MAINTENANCE LETTER (OUTPATIENT)
Age: 49
End: 2024-01-20

## 2024-01-22 NOTE — TELEPHONE ENCOUNTER
Central Prior Authorization Team - Phone: [phone]     Prior Authorization Approval    Medication: OZEMPIC (2 MG/DOSE) 8 MG/3ML SC SOPN  Authorization Effective Date: 1/22/2024  Authorization Expiration Date: 1/22/2025  Approved Dose/Quantity: 3  Reference #:     Insurance Company: [employer] [place] - Phone [phone] Fax [phone]  Expected CoPay: $    CoPay Card Available:      Financial Assistance Needed:   Which Pharmacy is filling the prescription: [id number] - [place], [place] - [address]  Pharmacy Notified: YES  Patient Notified: YES pharmacy will notify when ready

## 2024-01-29 ENCOUNTER — OFFICE VISIT (OUTPATIENT)
Dept: FAMILY MEDICINE | Facility: CLINIC | Age: 49
End: 2024-01-29
Attending: FAMILY MEDICINE
Payer: COMMERCIAL

## 2024-01-29 VITALS
HEIGHT: 66 IN | HEART RATE: 89 BPM | DIASTOLIC BLOOD PRESSURE: 83 MMHG | SYSTOLIC BLOOD PRESSURE: 126 MMHG | TEMPERATURE: 97.4 F | OXYGEN SATURATION: 98 % | BODY MASS INDEX: 31.08 KG/M2 | RESPIRATION RATE: 10 BRPM | WEIGHT: 193.4 LBS

## 2024-01-29 DIAGNOSIS — J35.8 TONSILLOLITH: ICD-10-CM

## 2024-01-29 DIAGNOSIS — G35 MS (MULTIPLE SCLEROSIS) (H): ICD-10-CM

## 2024-01-29 DIAGNOSIS — Z00.00 ROUTINE GENERAL MEDICAL EXAMINATION AT A HEALTH CARE FACILITY: Primary | ICD-10-CM

## 2024-01-29 DIAGNOSIS — E66.811 OBESITY, CLASS I, BMI 30-34.9: ICD-10-CM

## 2024-01-29 DIAGNOSIS — Z12.11 SCREEN FOR COLON CANCER: ICD-10-CM

## 2024-01-29 DIAGNOSIS — Z12.4 CERVICAL CANCER SCREENING: ICD-10-CM

## 2024-01-29 DIAGNOSIS — E55.9 VITAMIN D DEFICIENCY: ICD-10-CM

## 2024-01-29 DIAGNOSIS — Z12.31 ENCOUNTER FOR SCREENING MAMMOGRAM FOR BREAST CANCER: ICD-10-CM

## 2024-01-29 DIAGNOSIS — E11.9 TYPE 2 DIABETES MELLITUS WITHOUT COMPLICATION, WITHOUT LONG-TERM CURRENT USE OF INSULIN (H): ICD-10-CM

## 2024-01-29 DIAGNOSIS — F90.1 ATTENTION DEFICIT HYPERACTIVITY DISORDER (ADHD), PREDOMINANTLY HYPERACTIVE TYPE: ICD-10-CM

## 2024-01-29 LAB
HBA1C MFR BLD: 6 % (ref 0–5.6)
VIT D+METAB SERPL-MCNC: 45 NG/ML (ref 20–50)

## 2024-01-29 PROCEDURE — 36415 COLL VENOUS BLD VENIPUNCTURE: CPT | Performed by: FAMILY MEDICINE

## 2024-01-29 PROCEDURE — 82306 VITAMIN D 25 HYDROXY: CPT | Performed by: FAMILY MEDICINE

## 2024-01-29 PROCEDURE — 83036 HEMOGLOBIN GLYCOSYLATED A1C: CPT | Performed by: FAMILY MEDICINE

## 2024-01-29 PROCEDURE — 99396 PREV VISIT EST AGE 40-64: CPT | Performed by: FAMILY MEDICINE

## 2024-01-29 PROCEDURE — 99214 OFFICE O/P EST MOD 30 MIN: CPT | Mod: 25 | Performed by: FAMILY MEDICINE

## 2024-01-29 RX ORDER — DEXTROAMPHETAMINE SACCHARATE, AMPHETAMINE ASPARTATE MONOHYDRATE, DEXTROAMPHETAMINE SULFATE AND AMPHETAMINE SULFATE 3.75; 3.75; 3.75; 3.75 MG/1; MG/1; MG/1; MG/1
15 CAPSULE, EXTENDED RELEASE ORAL 2 TIMES DAILY
Qty: 60 CAPSULE | Refills: 0 | Status: SHIPPED | OUTPATIENT
Start: 2024-01-29 | End: 2024-04-08 | Stop reason: DRUGHIGH

## 2024-01-29 ASSESSMENT — ENCOUNTER SYMPTOMS
CONSTIPATION: 0
HEMATURIA: 0
FEVER: 0
EYE PAIN: 0
JOINT SWELLING: 0
PALPITATIONS: 0
HEARTBURN: 0
DYSURIA: 0
NERVOUS/ANXIOUS: 0
NAUSEA: 0
HEADACHES: 1
CHILLS: 0
HEMATOCHEZIA: 0
SHORTNESS OF BREATH: 1
BREAST MASS: 0
COUGH: 0
PARESTHESIAS: 0
SORE THROAT: 1
ABDOMINAL PAIN: 0
ARTHRALGIAS: 0
MYALGIAS: 0
FREQUENCY: 0
DIZZINESS: 0
DIARRHEA: 0
WEAKNESS: 0

## 2024-01-29 ASSESSMENT — PAIN SCALES - GENERAL: PAINLEVEL: NO PAIN (0)

## 2024-01-29 NOTE — COMMUNITY RESOURCES LIST (ENGLISH)
01/29/2024   [facility]  N/A  For questions about this resource list or additional care needs, please contact your primary care clinic or care manager.  Phone: [phone]   Email: N/A   Address: [address] [place] [address]   Hours: N/A        Food and Nutrition       Food pantry  1  [facility] - Emergency Assistance Program (EAP) - Food Distance: 2.86 miles      In-Person, Phone/Virtual   [address] [place] [address]  Language: American Sign Language, English  Hours: Mon - Fri 8:00 AM - 4:00 PM  Fees: Free   Phone: (790) 243-7914 Email: [place]@"[employer] Website: https://www.[employer]/residents#human-services     2  Community Emergency Assistance Programs (CEAP) - [facility] - Food Market Distance: 2.86 miles      Pickup   [address] [place] [address]  Language: English, Mauritian  Hours: Mon - Tue 9:00 AM - 4:30 PM , Wed 9:00 AM - 7:00 PM , Thu 9:00 AM - 4:30 PM  Fees: Free   Phone: (392) 816-7475 Email: info@[facility] Website: http://www.[employer].org     SNAP application assistance  3  [facility] Distance: 2.86 miles      In-Person, Phone/Virtual   [address] [place] [address]  Language: American Sign Language, English  Hours: Mon - Fri 8:00 AM - 4:00 PM  Fees: Free   Phone: (705) 112-4881 Email: serviceTango Publishingpaul[employer]@"[employer] Website: https://www.[employer]/residents#human-services     4  [facility] -  [place] [facility] (AIFW) Distance: 2.98 miles      In-Person, Phone/Virtual   [address] [place] [address]  Language: Macedonian, Uzbek, English, Gujarati, Artur, Maltese, Sinhala, Kazakh, French, Yi  Hours: Mon - Wed 9:00 AM - 5:00 PM , Thu 12:00 PM - 6:00 PM , Fri 9:00 AM - 5:00 PM , Sun 10:30 AM - 2:00 PM Appt. Only  Fees: Free   Phone: (440) 393-4384 Email: info@[facility].org  Website: https://www.sewa-aifw.org/     Soup kitchen or free meals  5  [facility] - Serving Up Nutrition Now for Youth Distance: 2.78 miles      Pickup   [address] [place] [address]  Language: English  Hours: Mon 11:30 AM - 1:30 PM , Wed 11:30 AM - 1:30 PM , Fri 11:30 AM - 1:30 PM  Fees: Free   Phone: (709) 629-7352 Ext.107 Email: admin@[facility] Website: http://[facility].org     6  [facility] - Family Table Meal Distance: 3.59 miles      Pickup   [address] [place] [address]  Language: English  Hours: Sat 11:30 AM - 12:30 PM  Fees: Free   Phone: (453) 304-3468 Email: jesu@[facility] Website: http://www.[facility].org/          Important Numbers & Websites       Emergency Services   911  [facility]   311  Poison Control   (589) 562-5653  Suicide Prevention Lifeline   (174) 697-2402 (TALK)  Child Abuse Hotline   (555) 957-9568 (4-A-Child)  Sexual Assault Hotline   (371) 362-9982 (HOPE)  National Runaway Safeline   (192) 704-3248 (RUNAWAY)  All-Options Talkline   (628) 979-1696  Substance Abuse Referral   (532) 241-3847 (HELP)

## 2024-01-29 NOTE — PATIENT INSTRUCTIONS
At [facility], we strive to deliver an exceptional experience to you, every time we see you. If you receive a survey, please complete it as we do value your feedback.  If you have MyChart, you can expect to receive results automatically within 24 hours of their completion.  Your provider will send a note interpreting your results as well.   If you do not have MyChart, you should receive your results in about a week by mail.    Your care team:                            Family Medicine Internal Medicine   MD Nicolas Rodrigues, MD Trena Cooney, MD Rosette Armendariz, PAClariC    Juan Carlos Felder, MD Pediatrics   Boyd Nayak, PA-C  Katerin Jiang, MD Fara Ervin, MD Paulina Jorgensen APRN KJ Mcdaniel CNP, MD Kimberly Contreras, MD Aisha Oliver, CNP  Same-Day (No follow up visit)   Abel Connor, ELENA Barcenas, PAClariC    YON IbarraC     Clinic hours: Monday - Thursday 7 am-6 pm; Fridays 7 am-5 pm.   Urgent care: Monday - Friday 10 am- 8 pm; Saturday and Sunday 9 am-5 pm.    Clinic: [phone]       [facility]: Monday - Thursday 8 am - 7 pm; Friday 8 am - 6 pm  [facility]: [phone]    Preventive Health Recommendations  Female Ages 40 to 49    Yearly exam:   See your health care provider every year in order to  Review health changes.   Discuss preventive care.    Review your medicines if your doctor prescribed any.    Get a Pap test every three years (unless you have an abnormal result and your provider advises testing more often).    If you get Pap tests with HPV test, you only need to test every 5 years, unless you have an abnormal result. You do not need a Pap test if your uterus was removed (hysterectomy) and you have not had cancer.    You should be tested each year for STDs (sexually transmitted diseases), if you're at risk.   Ask  your doctor if you should have a mammogram.    Have a colonoscopy (test for colon cancer) beginning at age 45.  Ask your provider about other options like a yearly FIT test or Cologuard test every 3 years (stool tests)      Have a cholesterol test every 5 years.     Have a diabetes test (fasting glucose) after age 45. If you are at risk for diabetes, you should have this test every 3 years.    Shots: Get a flu shot each year. Get a tetanus shot every 10 years.     Nutrition:   Eat at least 5 servings of fruits and vegetables each day.  Eat whole-grain bread, whole-wheat pasta and brown rice instead of white grains and rice.  Get adequate Calcium and Vitamin D.      Lifestyle  Exercise at least 150 minutes a week (an average of 30 minutes a day, 5 days a week). This will help you control your weight and prevent disease.  Limit alcohol to one drink per day.  No smoking.   Wear sunscreen to prevent skin cancer.  See your dentist every six months for an exam and cleaning.

## 2024-02-02 ENCOUNTER — TELEPHONE (OUTPATIENT)
Dept: GASTROENTEROLOGY | Facility: CLINIC | Age: 49
End: 2024-02-02
Payer: COMMERCIAL

## 2024-02-02 DIAGNOSIS — Z12.11 SCREEN FOR COLON CANCER: Primary | ICD-10-CM

## 2024-02-02 RX ORDER — BISACODYL 5 MG/1
TABLET, DELAYED RELEASE ORAL
Qty: 4 TABLET | Refills: 0 | Status: SHIPPED | OUTPATIENT
Start: 2024-02-02 | End: 2024-07-22

## 2024-02-02 NOTE — TELEPHONE ENCOUNTER
"Endoscopy Scheduling Screen    Have you had a positive Covid test in the last 14 days?  No    Are you active on MyChart?   Yes    What insurance is in the chart?  Other:  [employer]    Ordering/Referring Provider:   [facility]        (If ordering provider performs procedure, schedule with ordering provider unless otherwise instructed. )    BMI: Estimated body mass index is 31.53 kg/m  as calculated from the following:    Height as of 1/29/24: 1.668 m (5' 5.67\").    Weight as of 1/29/24: 87.7 kg (193 lb 6.4 oz).     Sedation Ordered  moderate sedation.   If patient BMI > 50 do not schedule in [facility].    If patient BMI > 45 do not schedule at [facility].    Are you taking methadone or Suboxone?  No    Have you had difficulties, pain, or discomfort during past endoscopy procedures?  No    Are you taking any prescription medications for pain 3 or more times per week?   NO - No RN review required.    Do you have a history of malignant hyperthermia or adverse reaction to anesthesia?  No    (Females) Are you currently pregnant?   No     Have you been diagnosed or told you have pulmonary hypertension?   No    Do you have an LVAD?  No    Have you been told you have moderate to severe sleep apnea?  No    Have you been told you have COPD, asthma, or any other lung disease?  No    Do you have any heart conditions?  No     Have you ever had an organ transplant?   No    Have you ever had or are you awaiting a heart or lung transplant?   No    Have you had a stroke or transient ischemic attack (TIA aka \"mini stroke\" in the last 6 months?   No    Have you been diagnosed with or been told you have cirrhosis of the liver?   No    Are you currently on dialysis?   No    Do you need assistance transferring?   No    BMI: Estimated body mass index is 31.53 kg/m  as calculated from the following:    Height as of 1/29/24: 1.668 m (5' 5.67\").    Weight as of 1/29/24: 87.7 kg (193 lb 6.4 oz).     Is patients BMI > 40 and scheduling " location [facility]?  No    Do you take an injectable medication for weight loss or diabetes (excluding insulin)?  Yes, hold time can be up to 7 days. Please check with you prescribing provider for recommendation.     Do you take the medication Naltrexone?  No    Do you take blood thinners?  No       Prep   Are you currently on dialysis or do you have chronic kidney disease?  No    Do you have a diagnosis of diabetes?  Yes (Golytely Prep)    Do you have a diagnosis of cystic fibrosis (CF)?  No    On a regular basis do you go 3 -5 days between bowel movements?  No    BMI > 40?  No    Preferred Pharmacy:        [facility] #[id number] - [place], [place] - [address] AT [facility] [address]  [facility] [address]  Phone: [phone] Fax: [phone]        Final Scheduling Details   Colonoscopy prep sent?  N/A    Procedure scheduled  Colonoscopy    Surgeon:  Andrew     Date of procedure:  2/16     Pre-OP / PAC:   No - Not required for this site.    Location  [facility] - [facility] - Patient preference.    Sedation   Moderate Sedation - Per order.      Patient Reminders:   You will receive a call from a Nurse to review instructions and health history.  This assessment must be completed prior to your procedure.  Failure to complete the Nurse assessment may result in the procedure being cancelled.      On the day of your procedure, please designate an adult(s) who can drive you home stay with you for the next 24 hours. The medicines used in the exam will make you sleepy. You will not be able to drive.      You cannot take public transportation, ride share services, or non-medical taxi service without a responsible caregiver.  Medical transport services are allowed with the requirement that a responsible caregiver will receive you at your destination.  We require that drivers and caregivers are confirmed prior to your procedure.

## 2024-02-02 NOTE — TELEPHONE ENCOUNTER
Attempted to contact patient in order to complete pre assessment questions.     No answer. Left message to return call to [phone] option 4    Missed call communication sent via [employer].      Procedure details:    Patient scheduled for Colonoscopy  on 02.16.2024.     Arrival time: 0645. Procedure time 0730    Pre op exam needed? N/A    Facility location: [facility]; [address], [place] [address]    Sedation type: Conscious sedation     Indication for procedure:  Screen for colon cancer       Chart review:     Electronic implanted devices? No    Recent diagnosis of diverticulitis within the last 6 weeks? No    Diabetic? Yes. See medication holding recommendations     Diabetic medication HOLDING recommendations: (if applicable)  Oral diabetic medications: N/A  Diabetic injectables: Yes- Ozempic (Semaglutide). Weekly dosing of medication.  Hold 7 days before procedure.  Follow up with managing provider.   Insulin: N/A      Medication review:    Anticoagulants? No    NSAIDS? No NSAID medications per patient's medication list.  RN will verify with pre-assessment call.    Other medication HOLDING recommendations:  N/A      Prep for procedure:     Bowel prep recommendation: Standard Golytely   Due to: diabetes.     Prep instructions sent via [employer]. Bowel prep script sent to    [facility] #[id number] - [place], [place] - [address] AT [facility] KELLEE SHEPHERD & RICKEY Solomon RN  Endoscopy Procedure Pre Assessment RN

## 2024-02-05 NOTE — TELEPHONE ENCOUNTER
Pre assessment completed for upcoming procedure.   (Please see previous telephone encounter notes for complete details)    Patient  returned call.       Procedure details:    Arrival time and facility location reviewed.    Pre op exam needed? N/A    Designated  policy reviewed. Instructed to have someone stay 6  hours post procedure.     COVID policy reviewed.      Medication review:    Medications reviewed. Please see supporting documentation below. Holding recommendations discussed (if applicable).   Injectable diabetic medication(s): Ozempic (Semaglutide). Weekly dosing of medication.  Hold 7 days before procedure.  Follow up with managing provider.       Prep for procedure:     Procedure prep instructions reviewed.        Any additional information needed:  N/A      Patient  verbalized understanding and had no questions or concerns at this time.      Esperanza Munoz RN  Endoscopy Procedure Pre Assessment RN  [phone] option 4

## 2024-02-15 NOTE — TELEPHONE ENCOUNTER
Patient calling in with questions regarding a head cold prior to procedure. Patient has colonoscopy scheduled for 2/16, does not want to reschedule. No chest congestion, breathing issues, no fever.     Has not taken covid test at this time. Was advised to pick one up and test today.     RN did attempt to contact [facility] to verify they did not have any other questions, no answer.     Patient advised that if she continues to just feel it is a head cold, covid test is negative, and she does not develop fever or respiratory issue - ok to have procedure.     Carol Ann Bullock, SCOT  Endoscopy Procedure Pre Assessment RN

## 2024-02-16 ENCOUNTER — HOSPITAL ENCOUNTER (OUTPATIENT)
Facility: AMBULATORY SURGERY CENTER | Age: 49
Discharge: HOME | End: 2024-02-16
Attending: SURGERY | Admitting: SURGERY
Payer: COMMERCIAL

## 2024-02-16 VITALS
TEMPERATURE: 97.5 F | DIASTOLIC BLOOD PRESSURE: 82 MMHG | RESPIRATION RATE: 16 BRPM | OXYGEN SATURATION: 98 % | SYSTOLIC BLOOD PRESSURE: 117 MMHG | HEART RATE: 85 BPM

## 2024-02-16 LAB
COLONOSCOPY: NORMAL
GLUCOSE BLDC GLUCOMTR-MCNC: 106 MG/DL (ref 70–99)

## 2024-02-16 PROCEDURE — G8907 PT DOC NO EVENTS ON DISCHARG: HCPCS

## 2024-02-16 PROCEDURE — 88305 TISSUE EXAM BY PATHOLOGIST: CPT | Performed by: PATHOLOGY

## 2024-02-16 PROCEDURE — 45385 COLONOSCOPY W/LESION REMOVAL: CPT

## 2024-02-16 PROCEDURE — G8918 PT W/O PREOP ORDER IV AB PRO: HCPCS

## 2024-02-16 RX ORDER — LIDOCAINE 40 MG/G
CREAM TOPICAL
Status: DISCONTINUED | OUTPATIENT
Start: 2024-02-16 | End: 2024-02-17 | Stop reason: HOSPADM

## 2024-02-16 RX ORDER — ONDANSETRON 2 MG/ML
4 INJECTION INTRAMUSCULAR; INTRAVENOUS
Status: DISCONTINUED | OUTPATIENT
Start: 2024-02-16 | End: 2024-02-17 | Stop reason: HOSPADM

## 2024-02-16 RX ORDER — NALOXONE HYDROCHLORIDE 0.4 MG/ML
0.2 INJECTION, SOLUTION INTRAMUSCULAR; INTRAVENOUS; SUBCUTANEOUS
Status: CANCELLED | OUTPATIENT
Start: 2024-02-16

## 2024-02-16 RX ORDER — FLUMAZENIL 0.1 MG/ML
0.2 INJECTION, SOLUTION INTRAVENOUS
Status: CANCELLED | OUTPATIENT
Start: 2024-02-16 | End: 2024-02-16

## 2024-02-16 RX ORDER — ONDANSETRON 4 MG/1
4 TABLET, ORALLY DISINTEGRATING ORAL EVERY 6 HOURS PRN
Status: CANCELLED | OUTPATIENT
Start: 2024-02-16

## 2024-02-16 RX ORDER — NALOXONE HYDROCHLORIDE 0.4 MG/ML
0.4 INJECTION, SOLUTION INTRAMUSCULAR; INTRAVENOUS; SUBCUTANEOUS
Status: CANCELLED | OUTPATIENT
Start: 2024-02-16

## 2024-02-16 RX ORDER — PROCHLORPERAZINE MALEATE 10 MG
10 TABLET ORAL EVERY 6 HOURS PRN
Status: CANCELLED | OUTPATIENT
Start: 2024-02-16

## 2024-02-16 RX ORDER — FENTANYL CITRATE 50 UG/ML
INJECTION, SOLUTION INTRAMUSCULAR; INTRAVENOUS PRN
Status: DISCONTINUED | OUTPATIENT
Start: 2024-02-16 | End: 2024-02-16 | Stop reason: HOSPADM

## 2024-02-16 RX ORDER — ONDANSETRON 2 MG/ML
4 INJECTION INTRAMUSCULAR; INTRAVENOUS EVERY 6 HOURS PRN
Status: CANCELLED | OUTPATIENT
Start: 2024-02-16

## 2024-02-16 NOTE — H&P
Pre-Endoscopy History and Physical     Radha Alvarez MRN# [id number]   YOB: 1975 Age: 48 year old     Date of Procedure: 2/16/2024  Primary care provider: Kimberly Tran  Type of Endoscopy: colonoscopy  Reason for Procedure: screening  Type of Anesthesia Anticipated: Moderate Sedation    HPI:    Radha is a 48 year old female who will be undergoing the above procedure.    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 The patient's medications and allergies have been reviewed. The risks and benefits of the procedure and the sedation options and risks were discussed with the patient.  All questions were answered and informed consent was obtained.      She denies a personal or family history of anesthesia complications or bleeding disorders.     Allergies   Allergen Reactions    Shellfish-Derived Products Anaphylaxis     Pt stated they are currently eating shell fish and havent had a reaction for 24 years.    Adhesive Tape         Cannot display prior to admission medications because the patient has not been admitted in this contact.     Patient Active Problem List   Diagnosis    MS (multiple sclerosis) (H)    Obesity, Class I, BMI 30-34.9    Type 2 diabetes mellitus without complication, without long-term current use of insulin (H)    Morbid obesity (H)         Patient Active Problem List   Diagnosis    MS (multiple sclerosis) (H)    Obesity, Class I, BMI 30-34.9    Type 2 diabetes mellitus without complication, without long-term current use of insulin (H)    Morbid obesity (H)        Past Medical History:   Diagnosis Date    Diabetes (H)     Incompetences, cervix 2004, 2006    cerclage 2004    MS (multiple sclerosis) (H) 07/01/2008        Past Surgical History:   Procedure Laterality  "Date    TUBAL LIGATION  4/2006       Social History     Tobacco Use    Smoking status: Never    Smokeless tobacco: Never   Substance Use Topics    Alcohol use: Yes       Family History   Family history unknown: Yes       REVIEW OF SYSTEMS:     5 point ROS negative except as noted above in HPI, including Gen., Resp., CV, GI &  system review.      PHYSICAL EXAM:   There were no vitals taken for this visit. Estimated body mass index is 31.53 kg/m  as calculated from the following:    Height as of 1/29/24: 1.668 m (5' 5.67\").    Weight as of 1/29/24: 87.7 kg (193 lb 6.4 oz).   GENERAL APPEARANCE: healthy, alert, and no distress  MENTAL STATUS: alert  AIRWAY EXAM: Mallampatti Class I (visualization of the soft palate, fauces, uvula, anterior and posterior pillars)  RESP: lungs clear to auscultation - no rales, rhonchi or wheezes  CV: regular rates and rhythm      DIAGNOSTICS:    Not indicated      IMPRESSION   ASA Class 2 - Mild systemic disease        PLAN:       Plan for colonoscopy. We discussed the risks, benefits and alternatives and the patient wished to proceed.    The above has been forwarded to the consulting provider.      Signed Electronically by: Donny Morales MD  February 16, 2024    "

## 2024-02-20 LAB
PATH REPORT.COMMENTS IMP SPEC: NORMAL
PATH REPORT.COMMENTS IMP SPEC: NORMAL
PATH REPORT.FINAL DX SPEC: NORMAL
PATH REPORT.GROSS SPEC: NORMAL
PATH REPORT.MICROSCOPIC SPEC OTHER STN: NORMAL
PATH REPORT.RELEVANT HX SPEC: NORMAL
PHOTO IMAGE: NORMAL

## 2024-03-07 ENCOUNTER — TELEPHONE (OUTPATIENT)
Dept: FAMILY MEDICINE | Facility: CLINIC | Age: 49
End: 2024-03-07
Payer: COMMERCIAL

## 2024-03-07 NOTE — TELEPHONE ENCOUNTER
Patient Quality Outreach    Patient is due for the following:   Diabetes -  Foot Exam  Cervical Cancer Screening - PAP Needed  Physical Preventive Adult Physical      Topic Date Due    Pneumococcal Vaccine (1 of 2 - PCV) Never done    Hepatitis B Vaccine (1 of 3 - 19+ 3-dose series) Never done    Flu Vaccine (1) Never done    COVID-19 Vaccine (4 - 2023-24 season) 09/01/2023       Next Steps:   Patient has upcoming appointment, these items will be addressed at that time.    Type of outreach:    Sent [employer] message.    Next Steps:  Reach out within 90 days via [employer].    Max number of attempts reached: Yes. Will try again in 90 days if patient still on fail list.    Questions for provider review:    None           Torin Stover MA  Chart routed to Care Team.

## 2024-04-08 DIAGNOSIS — K11.7 XEROSTOMIA: ICD-10-CM

## 2024-04-08 DIAGNOSIS — F90.1 ATTENTION DEFICIT HYPERACTIVITY DISORDER (ADHD), PREDOMINANTLY HYPERACTIVE TYPE: Primary | ICD-10-CM

## 2024-04-08 RX ORDER — PILOCARPINE HYDROCHLORIDE 5 MG/1
5 TABLET, FILM COATED ORAL DAILY
Qty: 30 TABLET | Refills: 3 | Status: SHIPPED | OUTPATIENT
Start: 2024-04-08 | End: 2024-04-11

## 2024-04-08 RX ORDER — DEXTROAMPHETAMINE SACCHARATE, AMPHETAMINE ASPARTATE MONOHYDRATE, DEXTROAMPHETAMINE SULFATE AND AMPHETAMINE SULFATE 3.75; 3.75; 3.75; 3.75 MG/1; MG/1; MG/1; MG/1
15 CAPSULE, EXTENDED RELEASE ORAL EVERY MORNING
Qty: 30 CAPSULE | Refills: 0 | Status: SHIPPED | OUTPATIENT
Start: 2024-04-08 | End: 2024-05-21

## 2024-04-08 RX ORDER — DEXTROAMPHETAMINE SACCHARATE, AMPHETAMINE ASPARTATE, DEXTROAMPHETAMINE SULFATE AND AMPHETAMINE SULFATE 3.75; 3.75; 3.75; 3.75 MG/1; MG/1; MG/1; MG/1
15 TABLET ORAL
Qty: 30 TABLET | Refills: 0 | Status: SHIPPED | OUTPATIENT
Start: 2024-04-08 | End: 2024-05-21

## 2024-04-11 RX ORDER — PILOCARPINE HYDROCHLORIDE 5 MG/1
5 TABLET, FILM COATED ORAL DAILY
Qty: 90 TABLET | Refills: 0 | Status: SHIPPED | OUTPATIENT
Start: 2024-04-11 | End: 2024-06-17

## 2024-05-15 ENCOUNTER — OFFICE VISIT (OUTPATIENT)
Dept: URGENT CARE | Facility: URGENT CARE | Age: 49
End: 2024-05-15
Payer: COMMERCIAL

## 2024-05-15 VITALS
HEART RATE: 85 BPM | RESPIRATION RATE: 14 BRPM | TEMPERATURE: 97.6 F | SYSTOLIC BLOOD PRESSURE: 133 MMHG | WEIGHT: 194.9 LBS | BODY MASS INDEX: 31.78 KG/M2 | OXYGEN SATURATION: 100 % | DIASTOLIC BLOOD PRESSURE: 84 MMHG

## 2024-05-15 DIAGNOSIS — J02.9 SORE THROAT: Primary | ICD-10-CM

## 2024-05-15 LAB
DEPRECATED S PYO AG THROAT QL EIA: NEGATIVE
GROUP A STREP BY PCR: NOT DETECTED

## 2024-05-15 PROCEDURE — 99213 OFFICE O/P EST LOW 20 MIN: CPT | Performed by: PHYSICIAN ASSISTANT

## 2024-05-15 PROCEDURE — 87651 STREP A DNA AMP PROBE: CPT | Performed by: PHYSICIAN ASSISTANT

## 2024-05-15 RX ORDER — DEXAMETHASONE SODIUM PHOSPHATE 10 MG/ML
10 INJECTION INTRAMUSCULAR; INTRAVENOUS ONCE
Status: COMPLETED | OUTPATIENT
Start: 2024-05-15 | End: 2024-05-15

## 2024-05-15 RX ADMIN — DEXAMETHASONE SODIUM PHOSPHATE 10 MG: 10 INJECTION INTRAMUSCULAR; INTRAVENOUS at 16:54

## 2024-05-15 ASSESSMENT — ENCOUNTER SYMPTOMS
FEVER: 0
PALPITATIONS: 0
LIGHT-HEADEDNESS: 0
WOUND: 0
RESPIRATORY NEGATIVE: 1
MYALGIAS: 0
RHINORRHEA: 0
VOMITING: 0
SORE THROAT: 1
COUGH: 0
MUSCULOSKELETAL NEGATIVE: 1
CHILLS: 0
WEAKNESS: 0
BACK PAIN: 0
NAUSEA: 0
DIZZINESS: 0
EYES NEGATIVE: 1
NECK STIFFNESS: 0
NECK PAIN: 0
ARTHRALGIAS: 0
HEADACHES: 0
ENDOCRINE NEGATIVE: 1
JOINT SWELLING: 0
CARDIOVASCULAR NEGATIVE: 1
ALLERGIC/IMMUNOLOGIC NEGATIVE: 1
SHORTNESS OF BREATH: 0
VOICE CHANGE: 1
DIARRHEA: 0

## 2024-05-15 NOTE — PROGRESS NOTES
Chief Complaint:     Chief Complaint   Patient presents with    Urgent Care    Laryngitis     Have 2nd round of laryngitis this year, had it since 4/26, still giving problem when use voice, talked to ENT but only emergency referral, here to see what can do and if can get emergency referral to ENT       Results for orders placed or performed in visit on 05/15/24   Streptococcus A Rapid Screen w/Reflex to PCR - Clinic Collect     Status: Normal    Specimen: Throat; Swab   Result Value Ref Range    Group A Strep antigen Negative Negative       Medical Decision Making:    Vital signs reviewed by Clem Calderon PA-C  /84 (BP Location: Left arm, Patient Position: Sitting, Cuff Size: Adult Large)   Pulse 85   Temp 97.6  F (36.4  C) (Tympanic)   Resp 14   Wt 88.4 kg (194 lb 14.4 oz)   SpO2 100%   BMI 31.78 kg/m      Differential Diagnosis:  URI Adult/Peds:  Sinusitis, Strep pharyngitis, Tonsilitis, Viral pharyngitis, and Viral syndrome        ASSESSMENT    1. Sore throat        PLAN    Patient is in no acute distress.    Temp is 97.6 in clinic today, lung sounds were clear, and O2 sats at 100% on RA.    RST was negative.  We will call with PCR results only if positive.  Patient given 10 Mg Decadron PO in clinic today.    Patient advised that I can not speed up when she is seen by ENT.  She may want to contact them to see if there is a list for cancelled appointments.    Worrisome symptoms discussed with instructions to go to the ED.  Patient verbalized understanding and agreed with this plan.    Labs:    Results for orders placed or performed in visit on 05/15/24   Streptococcus A Rapid Screen w/Reflex to PCR - Clinic Collect     Status: Normal    Specimen: Throat; Swab   Result Value Ref Range    Group A Strep antigen Negative Negative        Vital signs reviewed by Clem Calderon PA-C  /84 (BP Location: Left arm, Patient Position: Sitting, Cuff Size: Adult Large)   Pulse 85   Temp 97.6  F (36.4  C)  (Tympanic)   Resp 14   Wt 88.4 kg (194 lb 14.4 oz)   SpO2 100%   BMI 31.78 kg/m      Current Meds      Current Outpatient Medications:     albuterol (PROAIR HFA/PROVENTIL HFA/VENTOLIN HFA) 108 (90 Base) MCG/ACT inhaler, Inhale 2 puffs into the lungs every 4 hours as needed for shortness of breath, wheezing or cough, Disp: 18 g, Rfl: 0    amphetamine-dextroamphetamine (ADDERALL XR) 15 MG 24 hr capsule, Take 1 capsule (15 mg) by mouth every morning, Disp: 30 capsule, Rfl: 0    amphetamine-dextroamphetamine (ADDERALL) 15 MG tablet, Take 1 tablet (15 mg) by mouth daily at 2 pm, Disp: 30 tablet, Rfl: 0    bisacodyl (DULCOLAX) 5 MG EC tablet, Take 2 tablets at 3 pm the day before your procedure. If your procedure is before 11 am, take 2 additional tablets at 11 pm. If your procedure is after 11 am, take 2 additional tablets at 6 am. For additional instructions refer to your colonoscopy prep instructions., Disp: 4 tablet, Rfl: 0    blood glucose (NO BRAND SPECIFIED) lancets standard, Use to test blood sugar 4 times daily or as directed., Disp: 200 each, Rfl: 4    blood glucose (NO BRAND SPECIFIED) test strip, Use to test blood sugar 4 times daily or as directed., Disp: 200 strip, Rfl: 3    blood glucose monitoring (ACCU-CHEK FASTCLIX) lancets, , Disp: , Rfl:     blood glucose monitoring (NO BRAND SPECIFIED) meter device kit, Use to test blood sugar 4 times daily or as directed., Disp: 1 kit, Rfl: 0    Continuous Blood Gluc Sensor (DEXCOM G7 SENSOR) MISC, Change every 10 days., Disp: 3 each, Rfl: 11    fluticasone (FLONASE) 50 MCG/ACT nasal spray, Spray 1-2 sprays into both nostrils daily (Patient taking differently: Spray 1-2 sprays into both nostrils as needed), Disp: 1 Bottle, Rfl: 3    hydrOXYzine HCl (ATARAX) 25 MG tablet, TAKE 1 TABLET(25 MG) BY MOUTH THREE TIMES DAILY AS NEEDED FOR ANXIETY, Disp: 30 tablet, Rfl: 0    levonorgestrel (MIRENA) 20 MCG/24HR IUD, 1 Device by Intrauterine route, Disp: , Rfl:      magnesium 250 MG tablet, Take 1 tablet by mouth daily, Disp: , Rfl:     pilocarpine (SALAGEN) 5 MG tablet, TAKE 1 TABLET(5 MG) BY MOUTH DAILY, Disp: 90 tablet, Rfl: 0    polyethylene glycol (GOLYTELY) 236 g suspension, The night before the exam at 6 pm drink an 8-ounce glass every 15 minutes until the jug is half empty. If you arrive before 11 AM: Drink the other half of the Golytely jug at 11 PM night before procedure. If you arrive after 11 AM: Drink the other half of the Golytely jug at 6 AM day of procedure. For additional instructions refer to your colonoscopy prep instructions., Disp: 4000 mL, Rfl: 0    propranolol (INDERAL) 20 MG tablet, Take 1 tablet (20 mg) by mouth daily as needed (Anxiety), Disp: 90 tablet, Rfl: 1    Semaglutide, 1 MG/DOSE, (OZEMPIC) 4 MG/3ML pen, Inject 1 mg Subcutaneous once a week, Disp: 9 mL, Rfl: 1    Semaglutide, 2 MG/DOSE, (OZEMPIC) 8 MG/3ML pen, Inject 2 mg Subcutaneous every 7 days, Disp: 9 mL, Rfl: 1    vitamin D2 (ERGOCALCIFEROL) 34160 units (1250 mcg) capsule, Take 1 capsule (50,000 Units) by mouth once a week, Disp: 8 capsule, Rfl: 1  No current facility-administered medications for this visit.      SUBJECTIVE    HPI: Radha Alvarez is an 48 year old female who presents with ongoing tonsil stones, sore throat, and laryngitis.  Patient has an appointment with ENT on September 25th, and was told to come to  for emergent referral.  She currently has a bit of a sore throat.  No difficulties swallowing.  .      ROS:     Review of Systems   Constitutional:  Negative for chills and fever.   HENT:  Positive for sore throat and voice change. Negative for congestion, ear pain and rhinorrhea.    Eyes: Negative.    Respiratory: Negative.  Negative for cough and shortness of breath.    Cardiovascular: Negative.  Negative for chest pain and palpitations.   Gastrointestinal:  Negative for diarrhea, nausea and vomiting.   Endocrine: Negative.    Genitourinary: Negative.     Musculoskeletal: Negative.  Negative for arthralgias, back pain, joint swelling, myalgias, neck pain and neck stiffness.   Skin: Negative.  Negative for rash and wound.   Allergic/Immunologic: Negative.  Negative for immunocompromised state.   Neurological:  Negative for dizziness, weakness, light-headedness and headaches.         Family History   Family History   Family history unknown: Yes        Problem history  Patient Active Problem List   Diagnosis    MS (multiple sclerosis) (H)    Obesity, Class I, BMI 30-34.9    Type 2 diabetes mellitus without complication, without long-term current use of insulin (H)    Morbid obesity (H)        Allergies  Allergies   Allergen Reactions    Shellfish-Derived Products Anaphylaxis     Pt stated they are currently eating shell fish and havent had a reaction for 24 years.    Adhesive Tape         Social History  Social History     Socioeconomic History    Marital status: Single     Spouse name: Not on file    Number of children: Not on file    Years of education: Not on file    Highest education level: Not on file   Occupational History    Not on file   Tobacco Use    Smoking status: Never    Smokeless tobacco: Never   Vaping Use    Vaping status: Never Used   Substance and Sexual Activity    Alcohol use: Yes    Drug use: No    Sexual activity: Yes     Partners: Male     Birth control/protection: Implant   Other Topics Concern    Parent/sibling w/ CABG, MI or angioplasty before 65F 55M? Not Asked   Social History Narrative    Not on file     Social Determinants of Health     Financial Resource Strain: Low Risk  (1/29/2024)    Financial Resource Strain     Within the past 12 months, have you or your family members you live with been unable to get utilities (heat, electricity) when it was really needed?: No   Food Insecurity: High Risk (1/29/2024)    Food Insecurity     Within the past 12 months, did you worry that your food would run out before you got money to buy more?: Yes      Within the past 12 months, did the food you bought just not last and you didn t have money to get more?: No   Transportation Needs: Low Risk  (1/29/2024)    Transportation Needs     Within the past 12 months, has lack of transportation kept you from medical appointments, getting your medicines, non-medical meetings or appointments, work, or from getting things that you need?: No   Physical Activity: Not on file   Stress: Not on file   Social Connections: Not on file   Interpersonal Safety: Low Risk  (9/28/2023)    Interpersonal Safety     Do you feel physically and emotionally safe where you currently live?: Yes     Within the past 12 months, have you been hit, slapped, kicked or otherwise physically hurt by someone?: No     Within the past 12 months, have you been humiliated or emotionally abused in other ways by your partner or ex-partner?: No   Housing Stability: Low Risk  (1/29/2024)    Housing Stability     Do you have housing? : Yes     Are you worried about losing your housing?: No        OBJECTIVE     Vital signs reviewed by Clem Calderon PA-C  /84 (BP Location: Left arm, Patient Position: Sitting, Cuff Size: Adult Large)   Pulse 85   Temp 97.6  F (36.4  C) (Tympanic)   Resp 14   Wt 88.4 kg (194 lb 14.4 oz)   SpO2 100%   BMI 31.78 kg/m       Physical Exam  Vitals and nursing note reviewed.   Constitutional:       General: She is not in acute distress.     Appearance: She is well-developed. She is not ill-appearing, toxic-appearing or diaphoretic.   HENT:      Head: Normocephalic and atraumatic.      Right Ear: Tympanic membrane and external ear normal. No drainage, swelling or tenderness. Tympanic membrane is not perforated, erythematous, retracted or bulging.      Left Ear: Tympanic membrane and external ear normal. No drainage, swelling or tenderness. Tympanic membrane is not perforated, erythematous, retracted or bulging.      Nose: No mucosal edema, congestion or rhinorrhea.      Right  Sinus: No maxillary sinus tenderness or frontal sinus tenderness.      Left Sinus: No maxillary sinus tenderness or frontal sinus tenderness.      Mouth/Throat:      Pharynx: Posterior oropharyngeal erythema present. No pharyngeal swelling, oropharyngeal exudate or uvula swelling.      Tonsils: No tonsillar abscesses.   Eyes:      Pupils: Pupils are equal, round, and reactive to light.   Neck:      Trachea: Trachea normal.   Cardiovascular:      Rate and Rhythm: Normal rate and regular rhythm.      Heart sounds: Normal heart sounds, S1 normal and S2 normal. No murmur heard.     No friction rub. No gallop.   Pulmonary:      Effort: Pulmonary effort is normal. No respiratory distress.      Breath sounds: Normal breath sounds. No decreased breath sounds, wheezing, rhonchi or rales.   Abdominal:      General: Bowel sounds are normal. There is no distension.      Palpations: Abdomen is soft. Abdomen is not rigid. There is no mass.      Tenderness: There is no abdominal tenderness. There is no guarding or rebound.   Musculoskeletal:      Cervical back: Full passive range of motion without pain, normal range of motion and neck supple.   Lymphadenopathy:      Cervical: No cervical adenopathy.   Skin:     General: Skin is warm and dry.   Neurological:      Mental Status: She is alert and oriented to person, place, and time.      Cranial Nerves: No cranial nerve deficit.      Deep Tendon Reflexes: Reflexes are normal and symmetric.   Psychiatric:         Behavior: Behavior normal. Behavior is cooperative.         Thought Content: Thought content normal.         Judgment: Judgment normal.           Clem Calderon PA-C  5/15/2024, 4:05 PM

## 2024-05-15 NOTE — NURSING NOTE
Clinic Administered Medication Documentation    Patient was given Decadron. Prior to medication administration, verified patient's identity using patient's name and date of birth.    Jose F Ctoto CMA

## 2024-05-21 ENCOUNTER — MYC REFILL (OUTPATIENT)
Dept: ENDOCRINOLOGY | Facility: CLINIC | Age: 49
End: 2024-05-21
Payer: COMMERCIAL

## 2024-05-21 ENCOUNTER — MYC REFILL (OUTPATIENT)
Dept: FAMILY MEDICINE | Facility: CLINIC | Age: 49
End: 2024-05-21
Payer: COMMERCIAL

## 2024-05-21 DIAGNOSIS — E11.65 TYPE 2 DIABETES MELLITUS WITH HYPERGLYCEMIA, WITHOUT LONG-TERM CURRENT USE OF INSULIN (H): ICD-10-CM

## 2024-05-21 DIAGNOSIS — E11.9 TYPE 2 DIABETES MELLITUS WITHOUT COMPLICATION, WITHOUT LONG-TERM CURRENT USE OF INSULIN (H): ICD-10-CM

## 2024-05-21 DIAGNOSIS — F41.9 ANXIETY: ICD-10-CM

## 2024-05-21 DIAGNOSIS — F90.1 ATTENTION DEFICIT HYPERACTIVITY DISORDER (ADHD), PREDOMINANTLY HYPERACTIVE TYPE: ICD-10-CM

## 2024-05-22 NOTE — TELEPHONE ENCOUNTER
Last Written Prescription Date:  12/8/23  Last Fill Quantity: 9,  # refills: 1   Last office visit:  6/13/2023 with prescribing provider:  Dr. Gayle   Future Office Visit: Was due back 11/23        Requested Prescriptions   Pending Prescriptions Disp Refills    Semaglutide (1 MG/DOSE) (OZEMPIC) 4 MG/3ML pen 9 mL 1     Sig: Inject 1 mg Subcutaneous once a week       GLP-1 Agonists Protocol Failed - 5/21/2024  3:58 PM        Failed - Recent (6 mo) or future (90 days) visit within the authorizing provider's specialty     The patient must have completed an in-person or virtual visit within the past 6 months or has a future visit scheduled within the next 90 days with the authorizing provider s specialty.  Urgent care and e-visits do not quality as an office visit for this protocol.          Passed - HgbA1C in past 3 or 6 months     If HgbA1C is 8 or greater, it needs to be on file within the past 3 months.  If less than 8, must be on file within the past 6 months.     Recent Labs   Lab Test 01/29/24  1015   A1C 6.0*             Passed - Medication is active on med list        Passed - Has GFR on file in past 12 months and most recent value is normal        Passed - Medication indicated for associated diagnosis     Medication is associated with one or more of the following diagnoses:     Type 2 diabetes mellitus           Passed - Patient is age 18 or older        Passed - No active pregnancy on record        Passed - No positive pregnancy test in past 12 months          Semaglutide (2 MG/DOSE) (OZEMPIC) 8 MG/3ML pen 9 mL 1     Sig: Inject 2 mg Subcutaneous every 7 days       GLP-1 Agonists Protocol Failed - 5/21/2024  3:58 PM        Failed - Recent (6 mo) or future (90 days) visit within the authorizing provider's specialty     The patient must have completed an in-person or virtual visit within the past 6 months or has a future visit scheduled within the next 90 days with the authorizing provider s specialty.  Urgent  care and e-visits do not quality as an office visit for this protocol.          Passed - HgbA1C in past 3 or 6 months     If HgbA1C is 8 or greater, it needs to be on file within the past 3 months.  If less than 8, must be on file within the past 6 months.     Recent Labs   Lab Test 01/29/24  1015   A1C 6.0*             Passed - Medication is active on med list        Passed - Has GFR on file in past 12 months and most recent value is normal        Passed - Medication indicated for associated diagnosis     Medication is associated with one or more of the following diagnoses:     Type 2 diabetes mellitus           Passed - Patient is age 18 or older        Passed - No active pregnancy on record        Passed - No positive pregnancy test in past 12 months           Refill sent for 2 mg.  Will let pt know she is due for appt  Louise Alfonso RN

## 2024-05-23 RX ORDER — PROPRANOLOL HYDROCHLORIDE 20 MG/1
20 TABLET ORAL DAILY PRN
Qty: 90 TABLET | Refills: 1 | Status: SHIPPED | OUTPATIENT
Start: 2024-05-23

## 2024-05-24 RX ORDER — DEXTROAMPHETAMINE SACCHARATE, AMPHETAMINE ASPARTATE, DEXTROAMPHETAMINE SULFATE AND AMPHETAMINE SULFATE 3.75; 3.75; 3.75; 3.75 MG/1; MG/1; MG/1; MG/1
15 TABLET ORAL
Qty: 30 TABLET | Refills: 0 | Status: SHIPPED | OUTPATIENT
Start: 2024-05-24 | End: 2024-07-25

## 2024-05-24 RX ORDER — DEXTROAMPHETAMINE SACCHARATE, AMPHETAMINE ASPARTATE MONOHYDRATE, DEXTROAMPHETAMINE SULFATE AND AMPHETAMINE SULFATE 3.75; 3.75; 3.75; 3.75 MG/1; MG/1; MG/1; MG/1
15 CAPSULE, EXTENDED RELEASE ORAL EVERY MORNING
Qty: 30 CAPSULE | Refills: 0 | Status: SHIPPED | OUTPATIENT
Start: 2024-05-24 | End: 2024-07-25

## 2024-06-08 ENCOUNTER — HEALTH MAINTENANCE LETTER (OUTPATIENT)
Age: 49
End: 2024-06-08

## 2024-06-12 ENCOUNTER — LAB (OUTPATIENT)
Dept: LAB | Facility: CLINIC | Age: 49
End: 2024-06-12
Payer: COMMERCIAL

## 2024-06-12 DIAGNOSIS — E55.9 VITAMIN D DEFICIENCY: ICD-10-CM

## 2024-06-12 DIAGNOSIS — E11.9 TYPE 2 DIABETES MELLITUS WITHOUT COMPLICATION, WITHOUT LONG-TERM CURRENT USE OF INSULIN (H): Primary | ICD-10-CM

## 2024-06-12 LAB
HBA1C MFR BLD: 5.9 % (ref 0–5.6)
TSH SERPL DL<=0.005 MIU/L-ACNC: 1.14 UIU/ML (ref 0.3–4.2)

## 2024-06-12 PROCEDURE — 36415 COLL VENOUS BLD VENIPUNCTURE: CPT

## 2024-06-12 PROCEDURE — 84443 ASSAY THYROID STIM HORMONE: CPT

## 2024-06-12 PROCEDURE — 83036 HEMOGLOBIN GLYCOSYLATED A1C: CPT

## 2024-06-17 ENCOUNTER — MYC MEDICAL ADVICE (OUTPATIENT)
Dept: FAMILY MEDICINE | Facility: CLINIC | Age: 49
End: 2024-06-17
Payer: COMMERCIAL

## 2024-06-17 DIAGNOSIS — K11.7 XEROSTOMIA: ICD-10-CM

## 2024-06-17 NOTE — TELEPHONE ENCOUNTER
Routing to provider - patient requesting pilocarpine prescription be written at TID PRN instead of daily as currently prescribed, see below for TID dosing:        Med pended if appropriate, thank you!      Jamee Paiz RN, BSN  [facility]

## 2024-06-18 RX ORDER — PILOCARPINE HYDROCHLORIDE 5 MG/1
5 TABLET, FILM COATED ORAL 3 TIMES DAILY PRN
Qty: 90 TABLET | Refills: 1 | Status: SHIPPED | OUTPATIENT
Start: 2024-06-18

## 2024-06-27 ENCOUNTER — TELEPHONE (OUTPATIENT)
Dept: FAMILY MEDICINE | Facility: CLINIC | Age: 49
End: 2024-06-27
Payer: COMMERCIAL

## 2024-06-27 NOTE — TELEPHONE ENCOUNTER
Patient Quality Outreach    Patient is due for the following:   Cervical Cancer Screening - PAP Needed  Physical Preventive Adult Physical      Topic Date Due    Pneumococcal Vaccine (1 of 2 - PCV) Never done    Hepatitis B Vaccine (1 of 3 - 19+ 3-dose series) Never done    COVID-19 Vaccine (4 - 2023-24 season) 09/01/2023       Next Steps:   Patient has upcoming appointment, these items will be addressed at that time.    Type of outreach:    Sent [employer] message.    Next Steps:  Reach out within 90 days via [employer].    Max number of attempts reached: Yes. Will try again in 90 days if patient still on fail list.    Questions for provider review:    None           Torin Stover MA

## 2024-07-23 ENCOUNTER — OFFICE VISIT (OUTPATIENT)
Dept: FAMILY MEDICINE | Facility: CLINIC | Age: 49
End: 2024-07-23
Payer: COMMERCIAL

## 2024-07-23 VITALS
RESPIRATION RATE: 17 BRPM | HEART RATE: 82 BPM | SYSTOLIC BLOOD PRESSURE: 122 MMHG | DIASTOLIC BLOOD PRESSURE: 82 MMHG | HEIGHT: 66 IN | BODY MASS INDEX: 30.53 KG/M2 | OXYGEN SATURATION: 100 % | TEMPERATURE: 97.9 F | WEIGHT: 190 LBS

## 2024-07-23 DIAGNOSIS — Z30.433 ENCOUNTER FOR REMOVAL AND REINSERTION OF INTRAUTERINE CONTRACEPTIVE DEVICE: Primary | ICD-10-CM

## 2024-07-23 DIAGNOSIS — E11.9 TYPE 2 DIABETES MELLITUS WITHOUT COMPLICATION, WITHOUT LONG-TERM CURRENT USE OF INSULIN (H): ICD-10-CM

## 2024-07-23 DIAGNOSIS — N91.2 AMENORRHEA: ICD-10-CM

## 2024-07-23 DIAGNOSIS — Z12.11 SPECIAL SCREENING FOR MALIGNANT NEOPLASMS, COLON: ICD-10-CM

## 2024-07-23 PROBLEM — E66.01 MORBID OBESITY (H): Status: RESOLVED | Noted: 2022-08-24 | Resolved: 2024-07-23

## 2024-07-23 LAB — HCG UR QL: NEGATIVE

## 2024-07-23 PROCEDURE — 58301 REMOVE INTRAUTERINE DEVICE: CPT | Performed by: FAMILY MEDICINE

## 2024-07-23 PROCEDURE — 82043 UR ALBUMIN QUANTITATIVE: CPT | Performed by: FAMILY MEDICINE

## 2024-07-23 PROCEDURE — 81025 URINE PREGNANCY TEST: CPT | Performed by: FAMILY MEDICINE

## 2024-07-23 PROCEDURE — 82570 ASSAY OF URINE CREATININE: CPT | Performed by: FAMILY MEDICINE

## 2024-07-23 PROCEDURE — 99207 PR OFFICE/OUTPATIENT ESTABLISHED LOW MDM 20-29 MIN: CPT | Performed by: FAMILY MEDICINE

## 2024-07-23 PROCEDURE — G0145 SCR C/V CYTO,THINLAYER,RESCR: HCPCS | Performed by: FAMILY MEDICINE

## 2024-07-23 PROCEDURE — 87624 HPV HI-RISK TYP POOLED RSLT: CPT | Performed by: FAMILY MEDICINE

## 2024-07-23 PROCEDURE — 58300 INSERT INTRAUTERINE DEVICE: CPT | Performed by: FAMILY MEDICINE

## 2024-07-23 ASSESSMENT — PAIN SCALES - GENERAL: PAINLEVEL: NO PAIN (0)

## 2024-07-23 NOTE — PROGRESS NOTES
"SUBJECTIVE:  Radha Alvarez is a 48 year old female who presents to the clinic today for an IUD removal.        PMH/PSH/Meds/All were reviewed and updated at this visit.          OBJECTIVE:  no apparent distress  Blood pressure 122/82, pulse 82, temperature 97.9  F (36.6  C), temperature source Tympanic, resp. rate 17, height 1.67 m (5' 5.75\"), weight 86.2 kg (190 lb), SpO2 100%, not currently breastfeeding.    ASSESSMENT:  IUD correctly in place.  IUD removal.    PLAN:   IUD easily removed intact with a ring forceps.  Reportable signs and symptoms discussed.  Report any fevers or excessive cramps.  Back up contraception for the next 1-2 months prn.  Annual PAP smears recommended.  Follow up in 1 year or prn pregnancy.    /82   Pulse 82   Temp 97.9  F (36.6  C) (Tympanic)   Resp 17   Ht 1.67 m (5' 5.75\")   Wt 86.2 kg (190 lb)   SpO2 100%   BMI 30.90 kg/m            Emily Anasri MD     SUBJECTIVE:  Radha Alvarez is a 48 year old  female who presents today requesting removal and replacement of an @IUD@.  She is a G 4 P 3 with No LMP recorded. (Menstrual status: IUD).    Placed in .  Some bleeding with physical activity, sex or other women cycling.  Insurance ends at the end of the month.    PMH/PSH/Meds/All were reviewed and updated at this visit.    G 4 P 3   No LMP recorded. (Menstrual status: IUD).     Fasting: No   Td: tdap 2016       Flu: declined       Covid: 2022      Shingrix: NA      PPV: discussed       RSV: NA    No - age 30- 64 PAP with HPV every 5 years recommended               Cholesterol:   Lab Results   Component Value Date    CHOL 181 2023     Lab Results   Component Value Date    HDL 51 2023     Lab Results   Component Value Date     2023     Lab Results   Component Value Date    TRIG 66 2023     No results found for: \"CHOLHDLRATIO\"      MM2023  Dexa:  NA     Flex/colo: 2024 q10y screen      Seat Belt: Yes    Sunscreen use: Yes "   Calcium Intake: adeq  Health Care Directive: No  Sexually Active: Yes     Current contraception: condoms  History of abnormal Pap smear: No  Family history of colon/breast/ovarian cancer: No  Regular self breast exam: Yes  History of abnormal mammogram: No  I discussed the method, effectiveness, contraindications, risk, benefits, alternatives and the insertion procedure itself.  Patient was an appropriate candidate and desired to proceed.    Exam:  Pelvic: External genitalia and vagina normal.     IUD strings visible and grasped with ring forceps, removed easily.   After appropriate preparation, the cervix was grasped with a tenaculum and the uterine cavity sounded to 7.5 cm cm.  The Mirena IUD was inserted using standard and sterile technique.  The strings were trimmed to approximately 2.5cm.  No complications. Patient tolerated the procedure well.    IUD Lot#: [id number]  Exp Date: 8/2026  NDC#: 96750-139-05    The IUD effectiveness is 8 years.   Followup should be in 3 months with Emily Ansari MD prn.  Alert clinic to any problems.

## 2024-07-23 NOTE — PATIENT INSTRUCTIONS
Monitor for symptoms of infection to include a foul smelling discharge, abdominal tenderness or fever without other explanation.  Monitor for heavy bleeding (soaking a maxi pad every hour for 2-3 hours).

## 2024-07-24 LAB
CREAT UR-MCNC: 131 MG/DL
HPV HR 12 DNA CVX QL NAA+PROBE: NEGATIVE
HPV16 DNA CVX QL NAA+PROBE: NEGATIVE
HPV18 DNA CVX QL NAA+PROBE: NEGATIVE
HUMAN PAPILLOMA VIRUS FINAL DIAGNOSIS: NORMAL
MICROALBUMIN UR-MCNC: <12 MG/L
MICROALBUMIN/CREAT UR: NORMAL MG/G{CREAT}

## 2024-07-25 ENCOUNTER — OFFICE VISIT (OUTPATIENT)
Dept: FAMILY MEDICINE | Facility: CLINIC | Age: 49
End: 2024-07-25
Payer: COMMERCIAL

## 2024-07-25 VITALS
WEIGHT: 192 LBS | BODY MASS INDEX: 30.86 KG/M2 | OXYGEN SATURATION: 99 % | TEMPERATURE: 97 F | SYSTOLIC BLOOD PRESSURE: 139 MMHG | HEIGHT: 66 IN | RESPIRATION RATE: 16 BRPM | DIASTOLIC BLOOD PRESSURE: 85 MMHG | HEART RATE: 81 BPM

## 2024-07-25 DIAGNOSIS — F90.1 ATTENTION DEFICIT HYPERACTIVITY DISORDER (ADHD), PREDOMINANTLY HYPERACTIVE TYPE: ICD-10-CM

## 2024-07-25 DIAGNOSIS — K11.7 XEROSTOMIA: ICD-10-CM

## 2024-07-25 DIAGNOSIS — E11.9 TYPE 2 DIABETES MELLITUS WITHOUT COMPLICATION, WITHOUT LONG-TERM CURRENT USE OF INSULIN (H): ICD-10-CM

## 2024-07-25 DIAGNOSIS — B07.0 PLANTAR WARTS: Primary | ICD-10-CM

## 2024-07-25 DIAGNOSIS — Z13.220 SCREENING FOR HYPERLIPIDEMIA: ICD-10-CM

## 2024-07-25 PROBLEM — E11.65 TYPE 2 DIABETES MELLITUS WITH HYPERGLYCEMIA, WITHOUT LONG-TERM CURRENT USE OF INSULIN (H): Status: RESOLVED | Noted: 2019-12-19 | Resolved: 2024-07-25

## 2024-07-25 PROBLEM — E11.65 TYPE 2 DIABETES MELLITUS WITH HYPERGLYCEMIA, WITHOUT LONG-TERM CURRENT USE OF INSULIN (H): Status: ACTIVE | Noted: 2019-12-19

## 2024-07-25 LAB
ALT SERPL W P-5'-P-CCNC: 48 U/L (ref 0–50)
ANION GAP SERPL CALCULATED.3IONS-SCNC: 11 MMOL/L (ref 7–15)
BUN SERPL-MCNC: 15.6 MG/DL (ref 6–20)
CALCIUM SERPL-MCNC: 9.6 MG/DL (ref 8.8–10.4)
CHLORIDE SERPL-SCNC: 104 MMOL/L (ref 98–107)
CHOLEST SERPL-MCNC: 185 MG/DL
CREAT SERPL-MCNC: 0.74 MG/DL (ref 0.51–0.95)
EGFRCR SERPLBLD CKD-EPI 2021: >90 ML/MIN/1.73M2
FASTING STATUS PATIENT QL REPORTED: YES
FASTING STATUS PATIENT QL REPORTED: YES
GLUCOSE SERPL-MCNC: 110 MG/DL (ref 70–99)
HCO3 SERPL-SCNC: 24 MMOL/L (ref 22–29)
HDLC SERPL-MCNC: 58 MG/DL
LDLC SERPL CALC-MCNC: 114 MG/DL
NONHDLC SERPL-MCNC: 127 MG/DL
POTASSIUM SERPL-SCNC: 3.9 MMOL/L (ref 3.4–5.3)
SODIUM SERPL-SCNC: 139 MMOL/L (ref 135–145)
TRIGL SERPL-MCNC: 66 MG/DL
VIT D+METAB SERPL-MCNC: 40 NG/ML (ref 20–50)

## 2024-07-25 PROCEDURE — 80048 BASIC METABOLIC PNL TOTAL CA: CPT | Performed by: FAMILY MEDICINE

## 2024-07-25 PROCEDURE — 36415 COLL VENOUS BLD VENIPUNCTURE: CPT | Performed by: FAMILY MEDICINE

## 2024-07-25 PROCEDURE — G2211 COMPLEX E/M VISIT ADD ON: HCPCS | Performed by: FAMILY MEDICINE

## 2024-07-25 PROCEDURE — 84460 ALANINE AMINO (ALT) (SGPT): CPT | Performed by: FAMILY MEDICINE

## 2024-07-25 PROCEDURE — 99214 OFFICE O/P EST MOD 30 MIN: CPT | Performed by: FAMILY MEDICINE

## 2024-07-25 PROCEDURE — 82306 VITAMIN D 25 HYDROXY: CPT | Performed by: FAMILY MEDICINE

## 2024-07-25 PROCEDURE — 80061 LIPID PANEL: CPT | Performed by: FAMILY MEDICINE

## 2024-07-25 RX ORDER — IMIQUIMOD 12.5 MG/.25G
CREAM TOPICAL
Qty: 12 PACKET | Refills: 3 | Status: SHIPPED | OUTPATIENT
Start: 2024-07-26

## 2024-07-25 RX ORDER — DEXTROAMPHETAMINE SACCHARATE, AMPHETAMINE ASPARTATE MONOHYDRATE, DEXTROAMPHETAMINE SULFATE AND AMPHETAMINE SULFATE 3.75; 3.75; 3.75; 3.75 MG/1; MG/1; MG/1; MG/1
15 CAPSULE, EXTENDED RELEASE ORAL EVERY MORNING
Qty: 30 CAPSULE | Refills: 0 | Status: SHIPPED | OUTPATIENT
Start: 2024-07-25

## 2024-07-25 RX ORDER — DEXTROAMPHETAMINE SACCHARATE, AMPHETAMINE ASPARTATE, DEXTROAMPHETAMINE SULFATE AND AMPHETAMINE SULFATE 3.75; 3.75; 3.75; 3.75 MG/1; MG/1; MG/1; MG/1
15 TABLET ORAL
Qty: 30 TABLET | Refills: 0 | Status: SHIPPED | OUTPATIENT
Start: 2024-07-25

## 2024-07-25 ASSESSMENT — PAIN SCALES - GENERAL: PAINLEVEL: NO PAIN (0)

## 2024-07-25 NOTE — PROGRESS NOTES
"  Assessment & Plan     Plantar warts  -wart noted in right hand index finger in distal major crevice.  -She has been trying over-the-counter wart patches.    PLAN:  -Discussed pros and cons of freezing.  It might be painful if blister forms due to the location and tight compartment since wart is on the crevice   -Prescribed imiquimod.  She can still alternate with salicylic acid treatments.  -Follow-up in 3 months to reassess.    - imiquimod (ALDARA) 5 % external cream; Apply topically three times a week Apply a small sized amount to warts or molluscum three times weekly at bedtime.   Wash off after 8 hours.   May use for up to 16 weeks.    Type 2 diabetes mellitus without complication, without long-term current use of insulin (H)  -HbA1c 5.9 -1 month ago.  -Following with endocrinology but patient is okay following with me since A1c is stable.  -2 doses of Ozempic are listed in medication list due to supply issue.  She takes whichever is available.    - Basic metabolic panel  (Ca, Cl, CO2, Creat, Gluc, K, Na, BUN); Future  - ALT; Future  - Vitamin D Deficiency  - Basic metabolic panel  - Hemoglobin A1c  - ALT    Attention deficit hyperactivity disorder (ADHD), predominantly hyperactive type  -Wanted refill.  Checked [place] .  - amphetamine-dextroamphetamine (ADDERALL XR) 15 MG 24 hr capsule; Take 1 capsule (15 mg) by mouth every morning  - amphetamine-dextroamphetamine (ADDERALL) 15 MG tablet; Take 1 tablet (15 mg) by mouth daily at 2 pm    Screening for hyperlipidemia    - Lipid panel reflex to direct LDL Fasting; Future  - ALT; Future  - Lipid panel reflex to direct LDL Fasting  - ALT    Xerostomia  -On pilocarpine as needed for xerostomia.  -Recommended to get routine eye checkup.      BMI  Estimated body mass index is 31.23 kg/m  as calculated from the following:    Height as of this encounter: 1.67 m (5' 5.75\").    Weight as of this encounter: 87.1 kg (192 lb).             Subjective   Radha is a 48 year " "old, presenting for the following health issues:  Wart      7/25/2024     9:19 AM   Additional Questions   Roomed by Veronica   Accompanied by self     History of Present Illness       Reason for visit:  Wart removal or treatment  Symptom onset:  More than a month  Symptoms include:  Wart on right hand-pointer finger  Symptom intensity:  Mild  Symptom progression:  Worsening  Had these symptoms before:  No    She eats 0-1 servings of fruits and vegetables daily.She consumes 0 sweetened beverage(s) daily.She exercises with enough effort to increase her heart rate 9 or less minutes per day.  She exercises with enough effort to increase her heart rate 3 or less days per week.   She is taking medications regularly.                 Review of Systems  Constitutional, HEENT, cardiovascular, pulmonary, gi and gu systems are negative, except as otherwise noted.      Objective    /85 (BP Location: Left arm, Patient Position: Sitting, Cuff Size: Adult Large)   Pulse 81   Temp 97  F (36.1  C) (Temporal)   Resp 16   Ht 1.67 m (5' 5.75\")   Wt 87.1 kg (192 lb)   SpO2 99%   BMI 31.23 kg/m    Body mass index is 31.23 kg/m .  Physical Exam   GENERAL: alert and no distress  NECK: no adenopathy, no asymmetry, masses, or scars  RESP: lungs clear to auscultation - no rales, rhonchi or wheezes  CV: regular rate and rhythm, normal S1 S2, no S3 or S4, no murmur, click or rub, no peripheral edema  ABDOMEN: soft, nontender, no hepatosplenomegaly, no masses and bowel sounds normal  MS: no gross musculoskeletal defects noted, no edema            Signed Electronically by: Kimberly Contreras MD    "

## 2024-07-28 LAB
BKR LAB AP GYN ADEQUACY: NORMAL
BKR LAB AP GYN INTERPRETATION: NORMAL
BKR LAB AP PREVIOUS ABNORMAL: NORMAL
PATH REPORT.COMMENTS IMP SPEC: NORMAL
PATH REPORT.COMMENTS IMP SPEC: NORMAL
PATH REPORT.RELEVANT HX SPEC: NORMAL

## 2024-10-20 ENCOUNTER — HEALTH MAINTENANCE LETTER (OUTPATIENT)
Age: 49
End: 2024-10-20

## 2024-11-01 ENCOUNTER — TELEPHONE (OUTPATIENT)
Dept: FAMILY MEDICINE | Facility: CLINIC | Age: 49
End: 2024-11-01
Payer: COMMERCIAL

## 2024-11-01 NOTE — TELEPHONE ENCOUNTER
Patient Quality Outreach    Patient is due for the following:   Diabetes -  Foot Exam  Physical Preventive Adult Physical      Topic Date Due    Pneumococcal Vaccine (1 of 2 - PCV) Never done    Hepatitis B Vaccine (1 of 3 - 19+ 3-dose series) Never done    Flu Vaccine (1) Never done    COVID-19 Vaccine (4 - 2024-25 season) 09/01/2024       Next Steps:   Patient has upcoming appointment, these items will be addressed at that time.    Type of outreach:    Sent [email or web address] message.    Next Steps:  Reach out within 90 days via [email or web address].    Max number of attempts reached: Yes. Will try again in 90 days if patient still on fail list.    Questions for provider review:    None           Torin Stover MA

## 2024-11-14 ENCOUNTER — TELEPHONE (OUTPATIENT)
Dept: ENDOCRINOLOGY | Facility: CLINIC | Age: 49
End: 2024-11-14

## 2024-11-14 ENCOUNTER — MYC MEDICAL ADVICE (OUTPATIENT)
Dept: ENDOCRINOLOGY | Facility: CLINIC | Age: 49
End: 2024-11-14
Payer: COMMERCIAL

## 2024-11-14 DIAGNOSIS — E11.65 TYPE 2 DIABETES MELLITUS WITH HYPERGLYCEMIA, WITHOUT LONG-TERM CURRENT USE OF INSULIN (H): ICD-10-CM

## 2024-11-14 NOTE — TELEPHONE ENCOUNTER
Prior Authorization Approval    Medication: OZEMPIC (2 MG/DOSE) 8 MG/3ML SC SOPN  Authorization Effective Date: 11/14/2024  Authorization Expiration Date: 11/13/2025  Approved Dose/Quantity: 3 ml per 28 days  Reference #: [id number]   Insurance Company: Paid/[employer] ([employer]) - Phone [phone] Fax [phone]  Expected CoPay: $ 40  CoPay Card Available:      Financial Assistance Needed:   Which Pharmacy is filling the prescription:    Pharmacy Notified:   Patient Notified:          PA Initiation    Medication: OZEMPIC (2 MG/DOSE) 8 MG/3ML SC SOPN  Insurance Company: Paid/[employer] ([employer]) - Phone [phone] Fax [phone]  Pharmacy Filling the Rx:    Filling Pharmacy Phone:    Filling Pharmacy Fax:    Start Date: 11/14/2024    [id number]

## 2024-11-15 DIAGNOSIS — E11.65 TYPE 2 DIABETES MELLITUS WITH HYPERGLYCEMIA, WITHOUT LONG-TERM CURRENT USE OF INSULIN (H): ICD-10-CM

## 2024-11-15 RX ORDER — SEMAGLUTIDE 2.68 MG/ML
INJECTION, SOLUTION SUBCUTANEOUS
Qty: 9 ML | Refills: 1 | Status: SHIPPED | OUTPATIENT
Start: 2024-11-15

## 2024-11-15 NOTE — TELEPHONE ENCOUNTER
Pt is requesting Ozempic be filled at [facility].  Just sent refills yesterday to local pharmacy, so sent rx to mail order. Louise Alfonso RN

## 2024-11-25 ENCOUNTER — MYC REFILL (OUTPATIENT)
Dept: FAMILY MEDICINE | Facility: CLINIC | Age: 49
End: 2024-11-25
Payer: COMMERCIAL

## 2024-11-25 ENCOUNTER — MYC REFILL (OUTPATIENT)
Dept: ENDOCRINOLOGY | Facility: CLINIC | Age: 49
End: 2024-11-25
Payer: COMMERCIAL

## 2024-11-25 DIAGNOSIS — F41.9 ANXIETY: ICD-10-CM

## 2024-11-25 DIAGNOSIS — E11.9 TYPE 2 DIABETES MELLITUS WITHOUT COMPLICATION, WITHOUT LONG-TERM CURRENT USE OF INSULIN (H): ICD-10-CM

## 2024-11-25 DIAGNOSIS — K11.7 XEROSTOMIA: ICD-10-CM

## 2024-11-25 DIAGNOSIS — E11.65 TYPE 2 DIABETES MELLITUS WITH HYPERGLYCEMIA, WITHOUT LONG-TERM CURRENT USE OF INSULIN (H): ICD-10-CM

## 2024-11-25 DIAGNOSIS — F90.1 ATTENTION DEFICIT HYPERACTIVITY DISORDER (ADHD), PREDOMINANTLY HYPERACTIVE TYPE: ICD-10-CM

## 2024-11-25 RX ORDER — DEXTROAMPHETAMINE SACCHARATE, AMPHETAMINE ASPARTATE, DEXTROAMPHETAMINE SULFATE AND AMPHETAMINE SULFATE 3.75; 3.75; 3.75; 3.75 MG/1; MG/1; MG/1; MG/1
15 TABLET ORAL
Qty: 30 TABLET | Refills: 0 | Status: SHIPPED | OUTPATIENT
Start: 2024-11-25

## 2024-11-25 RX ORDER — DEXTROAMPHETAMINE SACCHARATE, AMPHETAMINE ASPARTATE MONOHYDRATE, DEXTROAMPHETAMINE SULFATE AND AMPHETAMINE SULFATE 3.75; 3.75; 3.75; 3.75 MG/1; MG/1; MG/1; MG/1
15 CAPSULE, EXTENDED RELEASE ORAL EVERY MORNING
Qty: 30 CAPSULE | Refills: 0 | Status: SHIPPED | OUTPATIENT
Start: 2024-11-25

## 2024-11-25 RX ORDER — SEMAGLUTIDE 2.68 MG/ML
INJECTION, SOLUTION SUBCUTANEOUS
Qty: 9 ML | Refills: 1 | Status: CANCELLED | OUTPATIENT
Start: 2024-11-25

## 2024-11-25 RX ORDER — PROPRANOLOL HCL 20 MG
20 TABLET ORAL DAILY PRN
Qty: 90 TABLET | Refills: 0 | Status: SHIPPED | OUTPATIENT
Start: 2024-11-25

## 2024-11-25 RX ORDER — PILOCARPINE HYDROCHLORIDE 5 MG/1
5 TABLET, FILM COATED ORAL 3 TIMES DAILY PRN
Qty: 90 TABLET | Refills: 1 | Status: SHIPPED | OUTPATIENT
Start: 2024-11-25

## 2025-01-15 NOTE — PROGRESS NOTES
Preventive Care Visit  [facility]  Adelinachristianedgar Contreras MD, Family Medicine  Jan 29, 2024       SUBJECTIVE:   Radha is a 48 year old, presenting for the following:  Physical        1/29/2024     9:24 AM   Additional Questions   Roomed by Kate     Healthy Habits:     Getting at least 3 servings of Calcium per day:  Yes    Bi-annual eye exam:  Yes    Dental care twice a year:  NO    Sleep apnea or symptoms of sleep apnea:  None    Diet:  Diabetic    Frequency of exercise:  1 day/week    Duration of exercise:  Less than 15 minutes    Taking medications regularly:  Yes    Medication side effects:  Other    Additional concerns today:  No      Today's PHQ-2 Score:       1/29/2024     9:24 AM   PHQ-2 ( 1999 [employer])   Q1: Little interest or pleasure in doing things 0   Q2: Feeling down, depressed or hopeless 0   PHQ-2 Score 0   Q1: Little interest or pleasure in doing things Not at all   Q2: Feeling down, depressed or hopeless Not at all   PHQ-2 Score 0           Via the Health Maintenance questionnaire, the patient has reported the following services have been completed -Eye Exam, this information has been sent to the abstraction team.          Have you ever done Advance Care Planning? (For example, a Health Directive, POLST, or a discussion with a medical provider or your loved ones about your wishes): No, advance care planning information given to patient to review.  Patient declined advance care planning discussion at this time.    Social History     Tobacco Use    Smoking status: Never    Smokeless tobacco: Never   Substance Use Topics    Alcohol use: Yes             1/29/2024     9:21 AM   Alcohol Use   Prescreen: >3 drinks/day or >7 drinks/week? No          No data to display              Reviewed orders with patient.  Reviewed health maintenance and updated orders accordingly - Yes  Lab work is in process    Breast Cancer Screening:  Any new diagnosis of family breast,  ovarian, or bowel cancer? No    FHS-7:       5/18/2022    11:01 AM 8/24/2023     1:21 PM   Breast CA Risk Assessment (FHS-7)   Did any of your first-degree relatives have breast or ovarian cancer? Unknown No   Did any of your relatives have bilateral breast cancer? Unknown No   Did any man in your family have breast cancer? Unknown No   Did any woman in your family have breast and ovarian cancer? Unknown No   Did any woman in your family have breast cancer before age 50 y? Unknown No   Do you have 2 or more relatives with breast and/or ovarian cancer? Unknown No   Do you have 2 or more relatives with breast and/or bowel cancer? Unknown No         Pertinent mammograms are reviewed under the imaging tab.    History of abnormal Pap smear: Last 3 Pap and HPV Results:       1/15/2019    12:00 AM   PAP / HPV   PAP-ABSTRACT See Scanned Document           This result is from an external source.         1/15/2019    12:00 AM   PAP / HPV   PAP-ABSTRACT See Scanned Document           This result is from an external source.     Reviewed and updated as needed this visit by clinical staff   Tobacco  Allergies  Meds              Reviewed and updated as needed this visit by Provider                    Review of Systems   Constitutional:  Negative for chills and fever.   HENT:  Positive for sore throat. Negative for congestion, ear pain and hearing loss.    Eyes:  Negative for pain.   Respiratory:  Positive for shortness of breath. Negative for cough.    Cardiovascular:  Negative for chest pain and palpitations.   Gastrointestinal:  Negative for abdominal pain, constipation, diarrhea and nausea.   Genitourinary:  Negative for dysuria, frequency, genital sores, hematuria, pelvic pain, urgency, vaginal bleeding and vaginal discharge.   Musculoskeletal:  Negative for arthralgias, joint swelling and myalgias.   Skin:  Negative for rash.   Neurological:  Positive for headaches. Negative for dizziness and weakness.  "  Psychiatric/Behavioral:  The patient is not nervous/anxious.          OBJECTIVE:   /83 (BP Location: Left arm, Patient Position: Sitting, Cuff Size: Adult Large)   Pulse 89   Temp 97.4  F (36.3  C) (Oral)   Resp 10   Ht 1.668 m (5' 5.67\")   Wt 87.7 kg (193 lb 6.4 oz)   SpO2 98%   BMI 31.53 kg/m     Estimated body mass index is 31.53 kg/m  as calculated from the following:    Height as of this encounter: 1.668 m (5' 5.67\").    Weight as of this encounter: 87.7 kg (193 lb 6.4 oz).  Physical Exam  GENERAL: alert and no distress  NECK: no adenopathy, no asymmetry, masses, or scars  RESP: lungs clear to auscultation - no rales, rhonchi or wheezes  CV: regular rate and rhythm, normal S1 S2, no S3 or S4, no murmur, click or rub, no peripheral edema  ABDOMEN: soft, nontender, no hepatosplenomegaly, no masses and bowel sounds normal  MS: no gross musculoskeletal defects noted, no edema    Diagnostic Test Results:  Labs reviewed in Epic    ASSESSMENT/PLAN:   Routine general medical examination at a health care facility      Type 2 diabetes mellitus without complication, without long-term current use of insulin (H)  -Following with endocrinology.  On Ozempic 1 mg dose.  -Not on Ozempic for the past 7 weeks due to supply issues.  Now back on it for the past 1 week.    PLAN:  -Will check HbA1c.  If it is too high, she will send a message to endocrinologist.  -Denied statin this visit    Update:  -HbA1c came at 6.0.  Continue current medications    - Hemoglobin A1c; Future  - Hemoglobin A1c    Attention deficit hyperactivity disorder (ADHD), predominantly hyperactive type  -Radha currently on Adderall extended release 20 mg.  30 mg XR at am is making her very tired.  -Radha reported that she was on Adderall XR 15 mg twice daily in the past which helped her a lot.  She will take the second dose around 1 PM to avoid sleep issues.  Prescribing Adderall XR 15 mg twice daily.  -Follow-up in a month to reassess. " "    - amphetamine-dextroamphetamine (ADDERALL XR) 15 MG 24 hr capsule; Take 1 capsule (15 mg) by mouth 2 times daily    Tonsillolith  -History of tonsilloliths, no active lesions noted today.  -Preferred ENT referral.    - Adult ENT  Referral; Future    Obesity, Class I, BMI 30-34.9  -Recommend healthy diet and active lifestyle.    Vitamin D deficiency  -Currently on over-the-counter vitamin D supplements.Radha reported that she had been on 50,000 units every winter.  -Following with endocrinology.  Will check vitamin D levels and decide.    - Vitamin D Deficiency; Future  - Vitamin D Deficiency    Screen for colon cancer  -First-time screening colonoscopy.  - Colonoscopy Screening  Referral; Future    Cervical cancer screening  -She has IUD in place which needs to be replaced.  -Radha preferred scheduling appointment with Gyn to get Pap and IUD replacement at the same time.      Encounter for screening mammogram for breast cancer    - [place] Screen Bilateral w/[place]; Future    MS (multiple sclerosis) (H)  -Stable.     Patient has been advised of split billing requirements and indicates understanding: Yes      Counseling  Reviewed preventive health counseling, as reflected in patient instructions       Regular exercise       Healthy diet/nutrition      BMI  Estimated body mass index is 31.53 kg/m  as calculated from the following:    Height as of this encounter: 1.668 m (5' 5.67\").    Weight as of this encounter: 87.7 kg (193 lb 6.4 oz).   Weight management plan: Discussed healthy diet and exercise guidelines      She reports that she has never smoked. She has never used smokeless tobacco.          Signed Electronically by: Kimberly Contreras MD  Answers submitted by the patient for this visit:  Annual Preventive Visit (Submitted on 1/29/2024)  Chief Complaint: Annual Exam:  Blood in stool: No  heartburn: No  peripheral edema: No  mood changes: No  Skin sensation changes: " No  tenderness: No  breast mass: No  breast discharge: No     caffeine

## 2025-01-26 ENCOUNTER — HEALTH MAINTENANCE LETTER (OUTPATIENT)
Age: 50
End: 2025-01-26

## 2025-02-20 DIAGNOSIS — E11.9 TYPE 2 DIABETES MELLITUS WITHOUT COMPLICATION, WITHOUT LONG-TERM CURRENT USE OF INSULIN (H): ICD-10-CM

## 2025-02-20 DIAGNOSIS — Z00.00 ROUTINE GENERAL MEDICAL EXAMINATION AT A HEALTH CARE FACILITY: Primary | ICD-10-CM

## 2025-02-23 ENCOUNTER — LAB (OUTPATIENT)
Dept: LAB | Facility: CLINIC | Age: 50
End: 2025-02-23
Payer: COMMERCIAL

## 2025-02-23 DIAGNOSIS — E11.9 TYPE 2 DIABETES MELLITUS WITHOUT COMPLICATION, WITHOUT LONG-TERM CURRENT USE OF INSULIN (H): ICD-10-CM

## 2025-02-23 DIAGNOSIS — Z00.00 ROUTINE GENERAL MEDICAL EXAMINATION AT A HEALTH CARE FACILITY: ICD-10-CM

## 2025-02-23 LAB
ERYTHROCYTE [DISTWIDTH] IN BLOOD BY AUTOMATED COUNT: 11.8 % (ref 10–15)
EST. AVERAGE GLUCOSE BLD GHB EST-MCNC: 120 MG/DL
HBA1C MFR BLD: 5.8 % (ref 0–5.6)
HCT VFR BLD AUTO: 42.6 % (ref 35–47)
HGB BLD-MCNC: 15.2 G/DL (ref 11.7–15.7)
MCH RBC QN AUTO: 30.1 PG (ref 26.5–33)
MCHC RBC AUTO-ENTMCNC: 35.7 G/DL (ref 31.5–36.5)
MCV RBC AUTO: 84 FL (ref 78–100)
PLATELET # BLD AUTO: 321 10E3/UL (ref 150–450)
RBC # BLD AUTO: 5.05 10E6/UL (ref 3.8–5.2)
WBC # BLD AUTO: 6.7 10E3/UL (ref 4–11)

## 2025-02-23 PROCEDURE — 83036 HEMOGLOBIN GLYCOSYLATED A1C: CPT

## 2025-02-23 PROCEDURE — 36415 COLL VENOUS BLD VENIPUNCTURE: CPT

## 2025-02-23 PROCEDURE — 82306 VITAMIN D 25 HYDROXY: CPT

## 2025-02-23 PROCEDURE — 80061 LIPID PANEL: CPT

## 2025-02-23 PROCEDURE — 85027 COMPLETE CBC AUTOMATED: CPT

## 2025-02-23 PROCEDURE — 80053 COMPREHEN METABOLIC PANEL: CPT

## 2025-02-24 LAB
ALBUMIN SERPL BCG-MCNC: 4.5 G/DL (ref 3.5–5.2)
ALP SERPL-CCNC: 92 U/L (ref 40–150)
ALT SERPL W P-5'-P-CCNC: 52 U/L (ref 0–50)
ANION GAP SERPL CALCULATED.3IONS-SCNC: 9 MMOL/L (ref 7–15)
AST SERPL W P-5'-P-CCNC: 26 U/L (ref 0–45)
BILIRUB SERPL-MCNC: 0.2 MG/DL
BUN SERPL-MCNC: 11.7 MG/DL (ref 6–20)
CALCIUM SERPL-MCNC: 9.4 MG/DL (ref 8.8–10.4)
CHLORIDE SERPL-SCNC: 106 MMOL/L (ref 98–107)
CHOLEST SERPL-MCNC: 169 MG/DL
CREAT SERPL-MCNC: 0.81 MG/DL (ref 0.51–0.95)
EGFRCR SERPLBLD CKD-EPI 2021: 89 ML/MIN/1.73M2
FASTING STATUS PATIENT QL REPORTED: YES
FASTING STATUS PATIENT QL REPORTED: YES
GLUCOSE SERPL-MCNC: 121 MG/DL (ref 70–99)
HCO3 SERPL-SCNC: 25 MMOL/L (ref 22–29)
HDLC SERPL-MCNC: 59 MG/DL
LDLC SERPL CALC-MCNC: 99 MG/DL
NONHDLC SERPL-MCNC: 110 MG/DL
POTASSIUM SERPL-SCNC: 4.2 MMOL/L (ref 3.4–5.3)
PROT SERPL-MCNC: 6.9 G/DL (ref 6.4–8.3)
SODIUM SERPL-SCNC: 140 MMOL/L (ref 135–145)
TRIGL SERPL-MCNC: 57 MG/DL
VIT D+METAB SERPL-MCNC: 35 NG/ML (ref 20–50)

## 2025-02-26 ENCOUNTER — IMMUNIZATION (OUTPATIENT)
Dept: FAMILY MEDICINE | Facility: CLINIC | Age: 50
End: 2025-02-26
Payer: COMMERCIAL

## 2025-02-26 DIAGNOSIS — Z23 ENCOUNTER FOR IMMUNIZATION: Primary | ICD-10-CM

## 2025-02-26 PROCEDURE — 99207 PR NO CHARGE NURSE ONLY: CPT

## 2025-02-26 PROCEDURE — 90480 ADMN SARSCOV2 VAC 1/ONLY CMP: CPT

## 2025-02-26 PROCEDURE — 91320 SARSCV2 VAC 30MCG TRS-SUC IM: CPT

## 2025-02-26 NOTE — PROGRESS NOTES
Prior to immunization administration, verified patients identity using patient s name and date of birth. Please see Immunization Activity for additional information.     Is the patient's temperature normal (100.5 or less)? Yes     Patient MEETS CRITERIA. PROCEED with vaccine administration.      Patient instructed to remain in clinic for 15 minutes afterwards, and to report any adverse reactions.      Link to Ancillary Visit Immunization Standing Orders SmartSet     Screening performed by Maldonado Velasquez MA on 2/26/2025 at 10:16 AM.

## 2025-03-02 ENCOUNTER — OFFICE VISIT (OUTPATIENT)
Dept: URGENT CARE | Facility: URGENT CARE | Age: 50
End: 2025-03-02
Payer: COMMERCIAL

## 2025-03-02 VITALS
WEIGHT: 187 LBS | OXYGEN SATURATION: 98 % | RESPIRATION RATE: 18 BRPM | SYSTOLIC BLOOD PRESSURE: 129 MMHG | TEMPERATURE: 98.7 F | DIASTOLIC BLOOD PRESSURE: 88 MMHG | HEART RATE: 94 BPM | BODY MASS INDEX: 30.41 KG/M2

## 2025-03-02 DIAGNOSIS — R60.0 PERIORBITAL EDEMA OF BOTH EYES: Primary | ICD-10-CM

## 2025-03-02 PROCEDURE — 99213 OFFICE O/P EST LOW 20 MIN: CPT | Performed by: PHYSICIAN ASSISTANT

## 2025-03-02 PROCEDURE — 1126F AMNT PAIN NOTED NONE PRSNT: CPT | Performed by: PHYSICIAN ASSISTANT

## 2025-03-02 PROCEDURE — 3074F SYST BP LT 130 MM HG: CPT | Performed by: PHYSICIAN ASSISTANT

## 2025-03-02 PROCEDURE — 3079F DIAST BP 80-89 MM HG: CPT | Performed by: PHYSICIAN ASSISTANT

## 2025-03-02 ASSESSMENT — ENCOUNTER SYMPTOMS
WEAKNESS: 0
DIZZINESS: 0
SHORTNESS OF BREATH: 0
NECK STIFFNESS: 0
ENDOCRINE NEGATIVE: 1
PALPITATIONS: 0
VOMITING: 0
ALLERGIC/IMMUNOLOGIC NEGATIVE: 1
JOINT SWELLING: 0
SORE THROAT: 0
FEVER: 0
BACK PAIN: 0
EYES NEGATIVE: 1
HEADACHES: 0
CHILLS: 0
RESPIRATORY NEGATIVE: 1
ARTHRALGIAS: 0
CARDIOVASCULAR NEGATIVE: 1
COUGH: 0
RHINORRHEA: 0
WOUND: 0
MUSCULOSKELETAL NEGATIVE: 1
LIGHT-HEADEDNESS: 0
NECK PAIN: 0
MYALGIAS: 0
DIARRHEA: 0
NAUSEA: 0

## 2025-03-02 ASSESSMENT — PAIN SCALES - GENERAL: PAINLEVEL_OUTOF10: NO PAIN (0)

## 2025-03-02 NOTE — PROGRESS NOTES
Chief Complaint:      Chief Complaint   Patient presents with    Eye Problem     Retaining fluid in eyes      Medical Decision Making:    Differential Diagnosis:  Allergies, cellulitis     ASSESSMENT     1. Periorbital edema of both eyes         PLAN    Patient currently has no periorbital edema.  She was instructed to follow up with her PCP if symptoms return for possible referral to allergy specialist.    Worrisome symptoms discussed with instructions to go to the ED.  Patient verbalized understanding and agreed with this plan.    Labs:    No results found for any visits on 03/02/25.       Current Meds    Current Outpatient Medications:     albuterol (PROAIR HFA/PROVENTIL HFA/VENTOLIN HFA) 108 (90 Base) MCG/ACT inhaler, Inhale 2 puffs into the lungs every 4 hours as needed for shortness of breath, wheezing or cough, Disp: 18 g, Rfl: 0    amphetamine-dextroamphetamine (ADDERALL XR) 15 MG 24 hr capsule, Take 1 capsule (15 mg) by mouth every morning., Disp: 30 capsule, Rfl: 0    amphetamine-dextroamphetamine (ADDERALL) 15 MG tablet, Take 1 tablet (15 mg) by mouth daily at 2 pm., Disp: 30 tablet, Rfl: 0    blood glucose (NO BRAND SPECIFIED) lancets standard, Use to test blood sugar 4 times daily or as directed., Disp: 200 each, Rfl: 4    blood glucose (NO BRAND SPECIFIED) test strip, Use to test blood sugar 4 times daily or as directed., Disp: 200 strip, Rfl: 3    blood glucose monitoring (ACCU-CHEK FASTCLIX) lancets, , Disp: , Rfl:     blood glucose monitoring (NO BRAND SPECIFIED) meter device kit, Use to test blood sugar 4 times daily or as directed., Disp: 1 kit, Rfl: 0    Continuous Blood Gluc Sensor (DEXCOM G7 SENSOR) MISC, Change every 10 days., Disp: 3 each, Rfl: 11    fluticasone (FLONASE) 50 MCG/ACT nasal spray, Spray 1-2 sprays into both nostrils daily (Patient taking differently: Spray 1-2 sprays into both nostrils as needed.), Disp: 1 Bottle, Rfl: 3    hydrOXYzine HCl (ATARAX) 25 MG tablet, TAKE 1  TABLET(25 MG) BY MOUTH THREE TIMES DAILY AS NEEDED FOR ANXIETY, Disp: 30 tablet, Rfl: 0    imiquimod (ALDARA) 5 % external cream, Apply topically three times a week Apply a small sized amount to warts or molluscum three times weekly at bedtime.   Wash off after 8 hours.   May use for up to 16 weeks., Disp: 12 packet, Rfl: 3    levonorgestrel (MIRENA) 20 MCG/24HR IUD, 1 Device by Intrauterine route, Disp: , Rfl:     levonorgestrel (MIRENA) 52 MG (20 mcg/day) IUD, 1 each by Intrauterine route continuously, Disp: , Rfl:     magnesium 250 MG tablet, Take 1 tablet by mouth daily, Disp: , Rfl:     pilocarpine (SALAGEN) 5 MG tablet, Take 1 tablet (5 mg) by mouth 3 times daily as needed (for dry mouth)., Disp: 90 tablet, Rfl: 1    propranolol (INDERAL) 20 MG tablet, Take 1 tablet (20 mg) by mouth daily as needed (Anxiety)., Disp: 90 tablet, Rfl: 0    Semaglutide, 1 MG/DOSE, (OZEMPIC) 4 MG/3ML pen, Inject 1 mg Subcutaneous once a week, Disp: 9 mL, Rfl: 1    Semaglutide, 2 MG/DOSE, (OZEMPIC, 2 MG/DOSE,) 8 MG/3ML pen, Inject 2 mg subcutaneously every 7 days, Disp: 9 mL, Rfl: 1    vitamin D2 (ERGOCALCIFEROL) 75400 units (1250 mcg) capsule, Take 1 capsule (50,000 Units) by mouth once a week, Disp: 8 capsule, Rfl: 1    Allergies  Allergies   Allergen Reactions    Shellfish-Derived Products Anaphylaxis     Pt stated they are currently eating shell fish and havent had a reaction for 24 years.    Adhesive Tape          SUBJECTIVE    HPI: Radha Alvarez is a 49 year old female presenting with swelling around her eyes.  This is an ongoing problem for her and she is wondering if there is something that can prevent it from happening.  She has tried allergy medication and cool compresses.  She denies any eye pain or vision changes.  Patient has a picture when her eyes were swollen, but is not swollen now.,    ROS:     Review of Systems   Constitutional:  Negative for chills and fever.   HENT:  Negative for congestion, ear pain,  rhinorrhea and sore throat.    Eyes: Negative.         Periorbital edema   Respiratory: Negative.  Negative for cough and shortness of breath.    Cardiovascular: Negative.  Negative for chest pain and palpitations.   Gastrointestinal:  Negative for diarrhea, nausea and vomiting.   Endocrine: Negative.    Genitourinary: Negative.    Musculoskeletal: Negative.  Negative for arthralgias, back pain, joint swelling, myalgias, neck pain and neck stiffness.   Skin: Negative.  Negative for rash and wound.   Allergic/Immunologic: Negative.  Negative for immunocompromised state.   Neurological:  Negative for dizziness, weakness, light-headedness and headaches.           Family History   Family History   Family history unknown: Yes        Problem history  Patient Active Problem List   Diagnosis    MS (multiple sclerosis) (H)    Obesity, Class I, BMI 30-34.9           Social History  Social History     Socioeconomic History    Marital status: Single     Spouse name: Not on file    Number of children: Not on file    Years of education: Not on file    Highest education level: Not on file   Occupational History    Not on file   Tobacco Use    Smoking status: Never    Smokeless tobacco: Never   Vaping Use    Vaping status: Never Used   Substance and Sexual Activity    Alcohol use: Yes    Drug use: No    Sexual activity: Yes     Partners: Male     Birth control/protection: Implant   Other Topics Concern    Parent/sibling w/ CABG, MI or angioplasty before 65F 55M? Not Asked   Social History Narrative    Not on file     Social Drivers of Health     Financial Resource Strain: Low Risk  (1/29/2024)    Financial Resource Strain     Within the past 12 months, have you or your family members you live with been unable to get utilities (heat, electricity) when it was really needed?: No   Food Insecurity: High Risk (1/29/2024)    Food Insecurity     Within the past 12 months, did you worry that your food would run out before you got money to  buy more?: Yes     Within the past 12 months, did the food you bought just not last and you didn t have money to get more?: No   Transportation Needs: Low Risk  (1/29/2024)    Transportation Needs     Within the past 12 months, has lack of transportation kept you from medical appointments, getting your medicines, non-medical meetings or appointments, work, or from getting things that you need?: No   Physical Activity: Not on file   Stress: Not on file   Social Connections: Not on file   Interpersonal Safety: Low Risk  (7/23/2024)    Interpersonal Safety     Do you feel physically and emotionally safe where you currently live?: Yes     Within the past 12 months, have you been hit, slapped, kicked or otherwise physically hurt by someone?: No     Within the past 12 months, have you been humiliated or emotionally abused in other ways by your partner or ex-partner?: No   Housing Stability: Low Risk  (1/29/2024)    Housing Stability     Do you have housing? : Yes     Are you worried about losing your housing?: No        OBJECTIVE     Vital signs reviewed by Clem Calderon PA-C  /88 (BP Location: Left arm, Patient Position: Sitting, Cuff Size: Adult Large)   Pulse 94   Temp 98.7  F (37.1  C) (Oral)   Resp 18   Wt 84.8 kg (187 lb)   SpO2 98%   BMI 30.41 kg/m       Physical Exam  Vitals and nursing note reviewed.   Constitutional:       General: She is not in acute distress.     Appearance: She is well-developed. She is not ill-appearing, toxic-appearing or diaphoretic.   HENT:      Head: Normocephalic and atraumatic.      Right Ear: Tympanic membrane and external ear normal. No drainage, swelling or tenderness. Tympanic membrane is not perforated, erythematous, retracted or bulging.      Left Ear: Tympanic membrane and external ear normal. No drainage, swelling or tenderness. Tympanic membrane is not perforated, erythematous, retracted or bulging.      Nose: No mucosal edema, congestion or rhinorrhea.       Right Sinus: No maxillary sinus tenderness or frontal sinus tenderness.      Left Sinus: No maxillary sinus tenderness or frontal sinus tenderness.      Mouth/Throat:      Pharynx: No pharyngeal swelling, oropharyngeal exudate, posterior oropharyngeal erythema or uvula swelling.      Tonsils: No tonsillar abscesses.   Eyes:      General:         Right eye: No foreign body, discharge or hordeolum.         Left eye: No foreign body, discharge or hordeolum.      Conjunctiva/sclera:      Right eye: Right conjunctiva is not injected. No chemosis, exudate or hemorrhage.     Left eye: Left conjunctiva is not injected. No chemosis, exudate or hemorrhage.     Pupils: Pupils are equal, round, and reactive to light.   Neck:      Trachea: Trachea normal.   Cardiovascular:      Rate and Rhythm: Normal rate and regular rhythm.      Heart sounds: Normal heart sounds, S1 normal and S2 normal. No murmur heard.     No friction rub. No gallop.   Pulmonary:      Effort: Pulmonary effort is normal. No respiratory distress.      Breath sounds: Normal breath sounds. No decreased breath sounds, wheezing, rhonchi or rales.   Abdominal:      General: Bowel sounds are normal. There is no distension.      Palpations: Abdomen is soft. Abdomen is not rigid. There is no mass.      Tenderness: There is no abdominal tenderness. There is no guarding or rebound.   Musculoskeletal:      Cervical back: Full passive range of motion without pain, normal range of motion and neck supple.   Lymphadenopathy:      Cervical: No cervical adenopathy.   Skin:     General: Skin is warm and dry.   Neurological:      Mental Status: She is alert and oriented to person, place, and time.      Cranial Nerves: No cranial nerve deficit.      Deep Tendon Reflexes: Reflexes are normal and symmetric.   Psychiatric:         Behavior: Behavior normal. Behavior is cooperative.         Thought Content: Thought content normal.         Judgment: Judgment normal.            Clem  DAVID Calderon PA-C  3/2/2025, 1:47 PM

## 2025-03-09 SDOH — HEALTH STABILITY: PHYSICAL HEALTH: ON AVERAGE, HOW MANY MINUTES DO YOU ENGAGE IN EXERCISE AT THIS LEVEL?: 40 MIN

## 2025-03-09 SDOH — HEALTH STABILITY: PHYSICAL HEALTH: ON AVERAGE, HOW MANY DAYS PER WEEK DO YOU ENGAGE IN MODERATE TO STRENUOUS EXERCISE (LIKE A BRISK WALK)?: 3 DAYS

## 2025-03-09 ASSESSMENT — SOCIAL DETERMINANTS OF HEALTH (SDOH): HOW OFTEN DO YOU GET TOGETHER WITH FRIENDS OR RELATIVES?: THREE TIMES A WEEK

## 2025-03-11 ENCOUNTER — OFFICE VISIT (OUTPATIENT)
Dept: FAMILY MEDICINE | Facility: CLINIC | Age: 50
End: 2025-03-11
Payer: COMMERCIAL

## 2025-03-11 VITALS
HEIGHT: 65 IN | DIASTOLIC BLOOD PRESSURE: 86 MMHG | HEART RATE: 89 BPM | BODY MASS INDEX: 30.79 KG/M2 | TEMPERATURE: 98 F | SYSTOLIC BLOOD PRESSURE: 129 MMHG | WEIGHT: 184.8 LBS | OXYGEN SATURATION: 99 % | RESPIRATION RATE: 16 BRPM

## 2025-03-11 DIAGNOSIS — F90.1 ATTENTION DEFICIT HYPERACTIVITY DISORDER (ADHD), PREDOMINANTLY HYPERACTIVE TYPE: ICD-10-CM

## 2025-03-11 DIAGNOSIS — Z00.00 ROUTINE GENERAL MEDICAL EXAMINATION AT A HEALTH CARE FACILITY: Primary | ICD-10-CM

## 2025-03-11 DIAGNOSIS — Z12.31 ENCOUNTER FOR SCREENING MAMMOGRAM FOR BREAST CANCER: ICD-10-CM

## 2025-03-11 DIAGNOSIS — E55.9 VITAMIN D DEFICIENCY: ICD-10-CM

## 2025-03-11 DIAGNOSIS — E11.9 TYPE 2 DIABETES MELLITUS WITHOUT COMPLICATION, WITHOUT LONG-TERM CURRENT USE OF INSULIN (H): ICD-10-CM

## 2025-03-11 PROCEDURE — 99396 PREV VISIT EST AGE 40-64: CPT | Performed by: FAMILY MEDICINE

## 2025-03-11 PROCEDURE — 3079F DIAST BP 80-89 MM HG: CPT | Performed by: FAMILY MEDICINE

## 2025-03-11 PROCEDURE — 99214 OFFICE O/P EST MOD 30 MIN: CPT | Mod: 25 | Performed by: FAMILY MEDICINE

## 2025-03-11 PROCEDURE — 1126F AMNT PAIN NOTED NONE PRSNT: CPT | Performed by: FAMILY MEDICINE

## 2025-03-11 PROCEDURE — 3074F SYST BP LT 130 MM HG: CPT | Performed by: FAMILY MEDICINE

## 2025-03-11 RX ORDER — DEXTROAMPHETAMINE SACCHARATE, AMPHETAMINE ASPARTATE, DEXTROAMPHETAMINE SULFATE AND AMPHETAMINE SULFATE 3.75; 3.75; 3.75; 3.75 MG/1; MG/1; MG/1; MG/1
15 TABLET ORAL
Qty: 30 TABLET | Refills: 0 | Status: SHIPPED | OUTPATIENT
Start: 2025-03-11

## 2025-03-11 RX ORDER — DEXTROAMPHETAMINE SACCHARATE, AMPHETAMINE ASPARTATE MONOHYDRATE, DEXTROAMPHETAMINE SULFATE AND AMPHETAMINE SULFATE 3.75; 3.75; 3.75; 3.75 MG/1; MG/1; MG/1; MG/1
15 CAPSULE, EXTENDED RELEASE ORAL EVERY MORNING
Qty: 30 CAPSULE | Refills: 0 | Status: SHIPPED | OUTPATIENT
Start: 2025-03-11

## 2025-03-11 ASSESSMENT — PAIN SCALES - GENERAL: PAINLEVEL_OUTOF10: NO PAIN (0)

## 2025-03-11 NOTE — PROGRESS NOTES
"Preventive Care Visit  [facility]  Kimberly Brandie Contreras MD, Family Medicine  Mar 11, 2025      Assessment & Plan     Routine general medical examination at a health care facility  -Up-to-date on Pap smear and colonoscopy.  -Normal colonoscopy done in 2024.  Sister is getting evaluated for colon cancer (likely premalignant polyps).  If she becomes positive for colon cancer, recommend colonoscopy every 5 years otherwise 10 years.    Type 2 diabetes mellitus without complication, without long-term current use of insulin (H)  -HbA1c of 5.8 -2 weeks ago.   -Continue Ozempic 2 mg.  -Patient preferred Ozempic 1 mg to be still in the chart, whenever there is a supply issue with 2 mg, she will take 1 mg dose.    Attention deficit hyperactivity disorder (ADHD), predominantly hyperactive type  -Stable, [place]  reviewed.    - amphetamine-dextroamphetamine (ADDERALL XR) 15 MG 24 hr capsule; Take 1 capsule (15 mg) by mouth every morning.  - amphetamine-dextroamphetamine (ADDERALL) 15 MG tablet; Take 1 tablet (15 mg) by mouth daily at 2 pm.    Vitamin D deficiency  -Radha not sure how much vitamin D she is taking.  She has to check.  -Since vitamin D levels are normal, recommended to continue the same dose.    Encounter for screening mammogram for breast cancer    - MA Screen Bilateral w/[place]; Future    Patient has been advised of split billing requirements and indicates understanding: Yes        BMI  Estimated body mass index is 30.42 kg/m  as calculated from the following:    Height as of this encounter: 1.66 m (5' 5.35\").    Weight as of this encounter: 83.8 kg (184 lb 12.8 oz).   Weight management plan: Discussed healthy diet and exercise guidelines    Counseling  Appropriate preventive services were addressed with this patient via screening, questionnaire, or discussion as appropriate for fall prevention, nutrition, physical activity, Tobacco-use cessation, social engagement, weight " loss and cognition.  Checklist reviewing preventive services available has been given to the patient.  Reviewed patient's diet, addressing concerns and/or questions.   She is at risk for lack of exercise and has been provided with information to increase physical activity for the benefit of her well-being.   The patient was instructed to see the dentist every 6 months.   She is at risk for psychosocial distress and has been provided with information to reduce risk.           Ariadne Garcia is a 49 year old, presenting for the following:  Physical        3/11/2025     2:52 PM   Additional Questions   Roomed by Veronica   Accompanied by self        HPI           Advance Care Planning  Patient does not have a Health Care Directive:       3/9/2025   General Health   How would you rate your overall physical health? Good   Feel stress (tense, anxious, or unable to sleep) Very much   (!) STRESS CONCERN      3/9/2025   Nutrition   Three or more servings of calcium each day? Yes   Diet: Regular (no restrictions)   How many servings of fruit and vegetables per day? (!) 2-3   How many sweetened beverages each day? 0-1         3/9/2025   Exercise   Days per week of moderate/strenous exercise 3 days   Average minutes spent exercising at this level 40 min         3/9/2025   Social Factors   Frequency of gathering with friends or relatives Three times a week   Worry food won't last until get money to buy more No   Food not last or not have enough money for food? No   Do you have housing? (Housing is defined as stable permanent housing and does not include staying ouside in a car, in a tent, in an abandoned building, in an overnight shelter, or couch-surfing.) Yes   Are you worried about losing your housing? No   Lack of transportation? No   Unable to get utilities (heat,electricity)? No         3/9/2025   Dental   Dentist two times every year? (!) NO           Today's PHQ-2 Score:       3/11/2025     2:47 PM   PHQ-2 ( 1999  [employer])   Q1: Little interest or pleasure in doing things 0   Q2: Feeling down, depressed or hopeless 0   PHQ-2 Score 0    Q1: Little interest or pleasure in doing things Not at all   Q2: Feeling down, depressed or hopeless Not at all   PHQ-2 Score 0       Patient-reported           3/9/2025   Substance Use   Alcohol more than 3/day or more than 7/wk No   Do you use any other substances recreationally? No     Social History     Tobacco Use    Smoking status: Never    Smokeless tobacco: Never   Vaping Use    Vaping status: Never Used   Substance Use Topics    Alcohol use: Yes    Drug use: No           8/24/2023   LAST FHS-7 RESULTS   1st degree relative breast or ovarian cancer No   Any relative bilateral breast cancer No   Any male have breast cancer No   Any ONE woman have BOTH breast AND ovarian cancer No   Any woman with breast cancer before 50yrs No   2 or more relatives with breast AND/OR ovarian cancer No   2 or more relatives with breast AND/OR bowel cancer No                3/9/2025   STI Screening   New sexual partner(s) since last STI/HIV test? No     History of abnormal Pap smear:         Latest Ref Rng & Units 7/23/2024    11:43 AM 1/15/2019    12:00 AM   PAP / HPV   PAP  Negative for Intraepithelial Lesion or Malignancy (NILM)     HPV 16 DNA Negative Negative     HPV 18 DNA Negative Negative     Other HR HPV Negative Negative     PAP-ABSTRACT   See Scanned Document           This result is from an external source.     ASCVD Risk   The 10-year ASCVD risk score (Radha MILLS, et al., 2019) is: 1.4%    Values used to calculate the score:      Age: 49 years      Sex: Female      Is Non- : No      Diabetic: Yes      Tobacco smoker: No      Systolic Blood Pressure: 129 mmHg      Is BP treated: No      HDL Cholesterol: 59 mg/dL      Total Cholesterol: 169 mg/dL       Reviewed and updated as needed this visit by Provider                          Review of Systems  Constitutional,  "HEENT, cardiovascular, pulmonary, gi and gu systems are negative, except as otherwise noted.     Objective    Exam  /86 (BP Location: Left arm, Patient Position: Sitting, Cuff Size: Adult Large)   Pulse 89   Temp 98  F (36.7  C) (Temporal)   Resp 16   Ht 1.66 m (5' 5.35\")   Wt 83.8 kg (184 lb 12.8 oz)   SpO2 99%   BMI 30.42 kg/m     Estimated body mass index is 30.42 kg/m  as calculated from the following:    Height as of this encounter: 1.66 m (5' 5.35\").    Weight as of this encounter: 83.8 kg (184 lb 12.8 oz).    Physical Exam  GENERAL: alert and no distress  NECK: no adenopathy, no asymmetry, masses, or scars  RESP: lungs clear to auscultation - no rales, rhonchi or wheezes  CV: regular rate and rhythm, normal S1 S2, no S3 or S4, no murmur, click or rub, no peripheral edema  ABDOMEN: soft, nontender, no hepatosplenomegaly, no masses and bowel sounds normal  MS: no gross musculoskeletal defects noted, no edema        Signed Electronically by: Kimberly Contreras MD    "

## 2025-03-11 NOTE — PATIENT INSTRUCTIONS
Patient Education   Preventive Care Advice   This is general advice given by our system to help you stay healthy. However, your care team may have specific advice just for you. Please talk to your care team about your preventive care needs.  Nutrition  Eat 5 or more servings of fruits and vegetables each day.  Try wheat bread, brown rice and whole grain pasta (instead of white bread, rice, and pasta).  Get enough calcium and vitamin D. Check the label on foods and aim for 100% of the RDA (recommended daily allowance).  Lifestyle  Exercise at least 150 minutes each week  (30 minutes a day, 5 days a week).  Do muscle strengthening activities 2 days a week. These help control your weight and prevent disease.  No smoking.  Wear sunscreen to prevent skin cancer.  Have a dental exam and cleaning every 6 months.  Yearly exams  See your health care team every year to talk about:  Any changes in your health.  Any medicines your care team has prescribed.  Preventive care, family planning, and ways to prevent chronic diseases.  Shots (vaccines)   HPV shots (up to age 26), if you've never had them before.  Hepatitis B shots (up to age 59), if you've never had them before.  COVID-19 shot: Get this shot when it's due.  Flu shot: Get a flu shot every year.  Tetanus shot: Get a tetanus shot every 10 years.  Pneumococcal, hepatitis A, and RSV shots: Ask your care team if you need these based on your risk.  Shingles shot (for age 50 and up)  General health tests  Diabetes screening:  Starting at age 35, Get screened for diabetes at least every 3 years.  If you are younger than age 35, ask your care team if you should be screened for diabetes.  Cholesterol test: At age 39, start having a cholesterol test every 5 years, or more often if advised.  Bone density scan (DEXA): At age 50, ask your care team if you should have this scan for osteoporosis (brittle bones).  Hepatitis C: Get tested at least once in your life.  STIs (sexually  transmitted infections)  Before age 24: Ask your care team if you should be screened for STIs.  After age 24: Get screened for STIs if you're at risk. You are at risk for STIs (including HIV) if:  You are sexually active with more than one person.  You don't use condoms every time.  You or a partner was diagnosed with a sexually transmitted infection.  If you are at risk for HIV, ask about PrEP medicine to prevent HIV.  Get tested for HIV at least once in your life, whether you are at risk for HIV or not.  Cancer screening tests  Cervical cancer screening: If you have a cervix, begin getting regular cervical cancer screening tests starting at age 21.  Breast cancer scan (mammogram): If you've ever had breasts, begin having regular mammograms starting at age 40. This is a scan to check for breast cancer.  Colon cancer screening: It is important to start screening for colon cancer at age 45.  Have a colonoscopy test every 10 years (or more often if you're at risk) Or, ask your provider about stool tests like a FIT test every year or Cologuard test every 3 years.  To learn more about your testing options, visit:   .  For help making a decision, visit:   https://bit.ly/xc05882.  Prostate cancer screening test: If you have a prostate, ask your care team if a prostate cancer screening test (PSA) at age 55 is right for you.  Lung cancer screening: If you are a current or former smoker ages 50 to 80, ask your care team if ongoing lung cancer screenings are right for you.  For informational purposes only. Not to replace the advice of your health care provider. Copyright   2023 [facility]. All rights reserved. Clinically reviewed by the [facility] Transitions [facility]. [employer] [id number] - REV 01/24.  Learning About Stress  What is stress?     Stress is your body's response to a hard situation. Your body can have a physical, emotional, or mental response. Stress is a fact of life for most people, and it  affects everyone differently. What causes stress for you may not be stressful for someone else.  A lot of things can cause stress. You may feel stress when you go on a job interview, take a test, or run a race. This kind of short-term stress is normal and even useful. It can help you if you need to work hard or react quickly. For example, stress can help you finish an important job on time.  Long-term stress is caused by ongoing stressful situations or events. Examples of long-term stress include long-term health problems, ongoing problems at work, or conflicts in your family. Long-term stress can harm your health.  How does stress affect your health?  When you are stressed, your body responds as though you are in danger. It makes hormones that speed up your heart, make you breathe faster, and give you a burst of energy. This is called the fight-or-flight stress response. If the stress is over quickly, your body goes back to normal and no harm is done.  But if stress happens too often or lasts too long, it can have bad effects. Long-term stress can make you more likely to get sick, and it can make symptoms of some diseases worse. If you tense up when you are stressed, you may develop neck, shoulder, or low back pain. Stress is linked to high blood pressure and heart disease.  Stress also harms your emotional health. It can make you foote, tense, or depressed. Your relationships may suffer, and you may not do well at work or school.  What can you do to manage stress?  You can try these things to help manage stress:   Do something active. Exercise or activity can help reduce stress. Walking is a great way to get started. Even everyday activities such as housecleaning or yard work can help.  Try yoga or dena chi. These techniques combine exercise and meditation. You may need some training at first to learn them.  Do something you enjoy. For example, listen to music or go to a movie. Practice your hobby or do volunteer  "work.  Meditate. This can help you relax, because you are not worrying about what happened before or what may happen in the future.  Do guided imagery. Imagine yourself in any setting that helps you feel calm. You can use online videos, books, or a teacher to guide you.  Do breathing exercises. For example:  From a standing position, bend forward from the waist with your knees slightly bent. Let your arms dangle close to the floor.  Breathe in slowly and deeply as you return to a standing position. Roll up slowly and lift your head last.  Hold your breath for just a few seconds in the standing position.  Breathe out slowly and bend forward from the waist.  Let your feelings out. Talk, laugh, cry, and express anger when you need to. Talking with supportive friends or family, a counselor, or a celso leader about your feelings is a healthy way to relieve stress. Avoid discussing your feelings with people who make you feel worse.  Write. It may help to write about things that are bothering you. This helps you find out how much stress you feel and what is causing it. When you know this, you can find better ways to cope.  What can you do to prevent stress?  You might try some of these things to help prevent stress:  Manage your time. This helps you find time to do the things you want and need to do.  Get enough sleep. Your body recovers from the stresses of the day while you are sleeping.  Get support. Your family, friends, and community can make a difference in how you experience stress.  Limit your news feed. Avoid or limit time on social media or news that may make you feel stressed.  Do something active. Exercise or activity can help reduce stress. Walking is a great way to get started.  Where can you learn more?  Go to https://www.[employer].net/patiented  Enter N032 in the search box to learn more about \"Learning About Stress.\"  Current as of: October 24, 2023  Content Version: 14.3    2024 [employer]. "   Care instructions adapted under license by your healthcare professional. If you have questions about a medical condition or this instruction, always ask your healthcare professional. [employer] disclaims any warranty or liability for your use of this information.  At [facility], we strive to deliver an exceptional experience to you, every time we see you. If you receive a survey, please let us know what we are doing well and/or what we could improve upon, as we do value your feedback.  If you have MyChart, you can expect to receive results automatically within 24 hours of their completion.  Your provider will send a note interpreting your results as well.   If you do not have MyChart, you should receive your results in about a week by mail.    Your care team:                            Family Medicine Internal Medicine   MD Nicolas Rodrigues, MD Chan Page MD Katya Belousova, ANDRE Felder, MD Pediatrics   Katerin Jiang, MD Fara Ervin, MD Jeanette Stafford, APRN CNP Paulina Jorgensen APRN CNP   Kimberly Contreras, MD Alcira Zarate, MD Aisha Oliver, CNP     Abel Connor, CNP Same-Day Provider (No follow-up visits)   KJ Okefee, DNP ANDRE Gilmore APRN, FNP, BC Andra Marcus PA-C     Clinic hours: Monday - Thursday 7 am-6 pm; Fridays 7 am-5 pm.   Urgent care: Monday - Friday 10 am- 8 pm; Saturday and Sunday 9 am-5 pm.    Clinic: [phone]       [facility]: Monday - Thursday 8 am - 7 pm; Friday 8 am - 6 pm  [facility]: [phone]

## 2025-03-12 ENCOUNTER — PATIENT OUTREACH (OUTPATIENT)
Dept: CARE COORDINATION | Facility: CLINIC | Age: 50
End: 2025-03-12
Payer: COMMERCIAL

## 2025-03-24 DIAGNOSIS — E11.65 TYPE 2 DIABETES MELLITUS WITH HYPERGLYCEMIA, WITHOUT LONG-TERM CURRENT USE OF INSULIN (H): ICD-10-CM

## 2025-03-24 RX ORDER — SEMAGLUTIDE 2.68 MG/ML
INJECTION, SOLUTION SUBCUTANEOUS
Qty: 3 ML | Refills: 0 | Status: SHIPPED | OUTPATIENT
Start: 2025-03-24 | End: 2025-03-25

## 2025-03-24 NOTE — TELEPHONE ENCOUNTER
Last Written Prescription Date:  11/15/24  Last Fill Quantity: 9,  # refills: 1   Last office visit: Visit date not found ; last virtual visit: 6/13/2023 with prescribing provider: Dr. Gayle   Future Office Visit: 4/1/25     Requested Prescriptions   Pending Prescriptions Disp Refills    Semaglutide (2 MG/DOSE) (OZEMPIC (2 MG/DOSE)) 8 MG/3ML pen [Pharmacy Med Name: OZEMPIC 8 MG/3 ML (2 MG/DOSE)]  1     Sig: INJECT 2 MG UNDER THE SKIN ONCE WEEKLY       GLP-1 Agonists Protocol Failed - 3/24/2025 11:26 AM        Failed - Medication is active on med list and the sig matches. RN to manually verify dose and sig if red X/fail.     If the protocol passes (green check), you do not need to verify med dose and sig.    A prescription matches if they are the same clinical intention.    For Example: once daily and every morning are the same.    The protocol can not identify upper and lower case letters as matching and will fail.     For Example: Take 1 tablet (50 mg) by mouth daily     TAKE 1 TABLET (50 MG) BY MOUTH DAILY    For all fails (red x), verify dose and sig.    If the refill does match what is on file, the RN can still proceed to approve the refill request.       If they do not match, route to the appropriate provider.             Failed - Recent (6 mo) or future (90 days) visit within the authorizing provider's specialty     The patient must have completed an in-person or virtual visit within the past 6 months or has a future visit scheduled within the next 90 days with the authorizing provider s specialty.  Urgent care and e-visits do not quality as an office visit for this protocol.          Passed - HgbA1C in past 3 or 6 months     If HgbA1C is 8 or greater, it needs to be on file within the past 3 months.  If less than 8, must be on file within the past 6 months.     Recent Labs   Lab Test 02/23/25  1108   A1C 5.8*             Passed - Has GFR on file in past 12 months and most recent value is normal         Passed - Medication indicated for associated diagnosis     Medication is associated with one or more of the following diagnoses:     Type 2 diabetes mellitus           Passed - Patient is age 18 or older        Passed - No active pregnancy on record        Passed - No positive pregnancy test in past 12 months           Refill sent to [facility] to [facility] next wk  Louise Alfonso RN

## 2025-03-25 DIAGNOSIS — E11.65 TYPE 2 DIABETES MELLITUS WITH HYPERGLYCEMIA, WITHOUT LONG-TERM CURRENT USE OF INSULIN (H): ICD-10-CM

## 2025-03-25 RX ORDER — SEMAGLUTIDE 2.68 MG/ML
INJECTION, SOLUTION SUBCUTANEOUS
Qty: 3 ML | Refills: 2 | Status: SHIPPED | OUTPATIENT
Start: 2025-03-25

## 2025-05-12 ENCOUNTER — PATIENT OUTREACH (OUTPATIENT)
Dept: CARE COORDINATION | Facility: CLINIC | Age: 50
End: 2025-05-12
Payer: COMMERCIAL

## 2025-05-25 ENCOUNTER — HEALTH MAINTENANCE LETTER (OUTPATIENT)
Age: 50
End: 2025-05-25

## 2025-06-23 DIAGNOSIS — E11.65 TYPE 2 DIABETES MELLITUS WITH HYPERGLYCEMIA, WITHOUT LONG-TERM CURRENT USE OF INSULIN (H): Primary | ICD-10-CM

## 2025-08-02 ENCOUNTER — TRANSFERRED RECORDS (OUTPATIENT)
Dept: HEALTH INFORMATION MANAGEMENT | Facility: CLINIC | Age: 50
End: 2025-08-02
Payer: COMMERCIAL

## 2025-08-02 LAB — RETINOPATHY: NORMAL

## 2025-08-05 ENCOUNTER — MYC MEDICAL ADVICE (OUTPATIENT)
Dept: FAMILY MEDICINE | Facility: CLINIC | Age: 50
End: 2025-08-05
Payer: COMMERCIAL

## 2025-08-05 DIAGNOSIS — E11.9 TYPE 2 DIABETES MELLITUS WITHOUT COMPLICATION, WITHOUT LONG-TERM CURRENT USE OF INSULIN (H): ICD-10-CM

## 2025-08-07 DIAGNOSIS — E11.9 TYPE 2 DIABETES MELLITUS WITHOUT COMPLICATION, WITHOUT LONG-TERM CURRENT USE OF INSULIN (H): Primary | ICD-10-CM

## 2025-08-07 RX ORDER — LANCETS
EACH MISCELLANEOUS
Qty: 100 EACH | Refills: 6 | Status: SHIPPED | OUTPATIENT
Start: 2025-08-07

## 2025-08-07 RX ORDER — LANCETS
EACH MISCELLANEOUS
Qty: 102 EACH | Refills: 3 | Status: SHIPPED | OUTPATIENT
Start: 2025-08-07

## 2025-08-25 ENCOUNTER — MYC REFILL (OUTPATIENT)
Dept: FAMILY MEDICINE | Facility: CLINIC | Age: 50
End: 2025-08-25
Payer: COMMERCIAL

## 2025-08-25 DIAGNOSIS — F41.9 ANXIETY: ICD-10-CM

## 2025-08-25 DIAGNOSIS — F90.1 ATTENTION DEFICIT HYPERACTIVITY DISORDER (ADHD), PREDOMINANTLY HYPERACTIVE TYPE: ICD-10-CM

## 2025-08-26 RX ORDER — HYDROXYZINE HYDROCHLORIDE 25 MG/1
25 TABLET, FILM COATED ORAL 3 TIMES DAILY PRN
Qty: 30 TABLET | Refills: 0 | Status: SHIPPED | OUTPATIENT
Start: 2025-08-26

## 2025-08-28 RX ORDER — DEXTROAMPHETAMINE SACCHARATE, AMPHETAMINE ASPARTATE, DEXTROAMPHETAMINE SULFATE AND AMPHETAMINE SULFATE 3.75; 3.75; 3.75; 3.75 MG/1; MG/1; MG/1; MG/1
15 TABLET ORAL
Qty: 30 TABLET | Refills: 0 | Status: SHIPPED | OUTPATIENT
Start: 2025-08-28

## 2025-08-28 RX ORDER — DEXTROAMPHETAMINE SACCHARATE, AMPHETAMINE ASPARTATE MONOHYDRATE, DEXTROAMPHETAMINE SULFATE AND AMPHETAMINE SULFATE 3.75; 3.75; 3.75; 3.75 MG/1; MG/1; MG/1; MG/1
15 CAPSULE, EXTENDED RELEASE ORAL EVERY MORNING
Qty: 30 CAPSULE | Refills: 0 | Status: SHIPPED | OUTPATIENT
Start: 2025-08-28

## (undated) DEVICE — PREP CHLORAPREP 26ML TINTED ORANGE  260815

## (undated) DEVICE — GLOVE BIOGEL PI MICRO INDICATOR UNDERGLOVE SZ 7.5 48975

## (undated) DEVICE — GLOVE BIOGEL PI ULTRATOUCH G SZ 7.5 42175

## (undated) DEVICE — SOL WATER IRRIG 1000ML BOTTLE 07139-09

## (undated) RX ORDER — FENTANYL CITRATE 50 UG/ML
INJECTION, SOLUTION INTRAMUSCULAR; INTRAVENOUS
Status: DISPENSED
Start: 2024-02-16

## (undated) RX ORDER — SIMETHICONE 40MG/0.6ML
SUSPENSION, DROPS(FINAL DOSAGE FORM)(ML) ORAL
Status: DISPENSED
Start: 2024-02-16